# Patient Record
Sex: MALE | Race: WHITE | Employment: OTHER | URBAN - METROPOLITAN AREA
[De-identification: names, ages, dates, MRNs, and addresses within clinical notes are randomized per-mention and may not be internally consistent; named-entity substitution may affect disease eponyms.]

---

## 2023-04-03 LAB
ALANINE AMINOTRANSFERASE (SGPT) (U/L) IN SER/PLAS: 21 U/L (ref 10–52)
ALBUMIN (G/DL) IN SER/PLAS: 4.3 G/DL (ref 3.4–5)
ALKALINE PHOSPHATASE (U/L) IN SER/PLAS: 64 U/L (ref 33–136)
ANION GAP IN SER/PLAS: 14 MMOL/L (ref 10–20)
ASPARTATE AMINOTRANSFERASE (SGOT) (U/L) IN SER/PLAS: 23 U/L (ref 9–39)
BASOPHILS (10*3/UL) IN BLOOD BY AUTOMATED COUNT: 0.04 X10E9/L (ref 0–0.1)
BASOPHILS/100 LEUKOCYTES IN BLOOD BY AUTOMATED COUNT: 1 % (ref 0–2)
BILIRUBIN TOTAL (MG/DL) IN SER/PLAS: 0.9 MG/DL (ref 0–1.2)
CALCIUM (MG/DL) IN SER/PLAS: 9.4 MG/DL (ref 8.6–10.3)
CARBON DIOXIDE, TOTAL (MMOL/L) IN SER/PLAS: 26 MMOL/L (ref 21–32)
CHLORIDE (MMOL/L) IN SER/PLAS: 105 MMOL/L (ref 98–107)
CHOLESTEROL (MG/DL) IN SER/PLAS: 123 MG/DL (ref 0–199)
CHOLESTEROL IN HDL (MG/DL) IN SER/PLAS: 57.7 MG/DL
CHOLESTEROL/HDL RATIO: 2.1
CREATININE (MG/DL) IN SER/PLAS: 1.03 MG/DL (ref 0.5–1.3)
EOSINOPHILS (10*3/UL) IN BLOOD BY AUTOMATED COUNT: 0.13 X10E9/L (ref 0–0.7)
EOSINOPHILS/100 LEUKOCYTES IN BLOOD BY AUTOMATED COUNT: 3.3 % (ref 0–6)
ERYTHROCYTE DISTRIBUTION WIDTH (RATIO) BY AUTOMATED COUNT: 13.9 % (ref 11.5–14.5)
ERYTHROCYTE MEAN CORPUSCULAR HEMOGLOBIN CONCENTRATION (G/DL) BY AUTOMATED: 32.6 G/DL (ref 32–36)
ERYTHROCYTE MEAN CORPUSCULAR VOLUME (FL) BY AUTOMATED COUNT: 86 FL (ref 80–100)
ERYTHROCYTES (10*6/UL) IN BLOOD BY AUTOMATED COUNT: 5.29 X10E12/L (ref 4.5–5.9)
GFR MALE: 79 ML/MIN/1.73M2
GLUCOSE (MG/DL) IN SER/PLAS: 83 MG/DL (ref 74–99)
HEMATOCRIT (%) IN BLOOD BY AUTOMATED COUNT: 45.4 % (ref 41–52)
HEMOGLOBIN (G/DL) IN BLOOD: 14.8 G/DL (ref 13.5–17.5)
IMMATURE GRANULOCYTES/100 LEUKOCYTES IN BLOOD BY AUTOMATED COUNT: 0.3 % (ref 0–0.9)
LDL: 51 MG/DL (ref 0–99)
LEUKOCYTES (10*3/UL) IN BLOOD BY AUTOMATED COUNT: 3.9 X10E9/L (ref 4.4–11.3)
LYMPHOCYTES (10*3/UL) IN BLOOD BY AUTOMATED COUNT: 1.58 X10E9/L (ref 1.2–4.8)
LYMPHOCYTES/100 LEUKOCYTES IN BLOOD BY AUTOMATED COUNT: 40.5 % (ref 13–44)
MONOCYTES (10*3/UL) IN BLOOD BY AUTOMATED COUNT: 0.33 X10E9/L (ref 0.1–1)
MONOCYTES/100 LEUKOCYTES IN BLOOD BY AUTOMATED COUNT: 8.5 % (ref 2–10)
NEUTROPHILS (10*3/UL) IN BLOOD BY AUTOMATED COUNT: 1.81 X10E9/L (ref 1.2–7.7)
NEUTROPHILS/100 LEUKOCYTES IN BLOOD BY AUTOMATED COUNT: 46.4 % (ref 40–80)
PLATELETS (10*3/UL) IN BLOOD AUTOMATED COUNT: 174 X10E9/L (ref 150–450)
POTASSIUM (MMOL/L) IN SER/PLAS: 4.2 MMOL/L (ref 3.5–5.3)
PROTEIN TOTAL: 6.8 G/DL (ref 6.4–8.2)
SODIUM (MMOL/L) IN SER/PLAS: 141 MMOL/L (ref 136–145)
THYROTROPIN (MIU/L) IN SER/PLAS BY DETECTION LIMIT <= 0.05 MIU/L: 1.48 MIU/L (ref 0.44–3.98)
TRIGLYCERIDE (MG/DL) IN SER/PLAS: 74 MG/DL (ref 0–149)
UREA NITROGEN (MG/DL) IN SER/PLAS: 12 MG/DL (ref 6–23)
VLDL: 15 MG/DL (ref 0–40)

## 2023-07-13 LAB — PROSTATE SPECIFIC AG (NG/ML) IN SER/PLAS: 1.7 NG/ML (ref 0–4)

## 2023-07-19 ENCOUNTER — TRANSCRIBE ORDERS (OUTPATIENT)
Dept: GENERAL RADIOLOGY | Age: 68
End: 2023-07-19

## 2023-07-19 ENCOUNTER — HOSPITAL ENCOUNTER (OUTPATIENT)
Dept: GENERAL RADIOLOGY | Age: 68
Discharge: HOME | End: 2023-07-21
Attending: INTERNAL MEDICINE
Payer: COMMERCIAL

## 2023-07-19 DIAGNOSIS — R05.9 COUGH, UNSPECIFIED TYPE: ICD-10-CM

## 2023-07-19 DIAGNOSIS — R05.9 COUGH, UNSPECIFIED TYPE: Primary | ICD-10-CM

## 2023-07-19 PROCEDURE — 71046 X-RAY EXAM CHEST 2 VIEWS: CPT

## 2024-01-02 DIAGNOSIS — N32.81 OAB (OVERACTIVE BLADDER): ICD-10-CM

## 2024-01-02 RX ORDER — MIRABEGRON 50 MG/1
1 TABLET, EXTENDED RELEASE ORAL DAILY
COMMUNITY
Start: 2019-04-22 | End: 2024-01-02 | Stop reason: SDUPTHER

## 2024-01-03 RX ORDER — MIRABEGRON 50 MG/1
50 TABLET, EXTENDED RELEASE ORAL DAILY
Qty: 90 TABLET | Refills: 3 | Status: SHIPPED | OUTPATIENT
Start: 2024-01-03

## 2024-01-23 ENCOUNTER — OFFICE VISIT (OUTPATIENT)
Dept: UROLOGY | Facility: CLINIC | Age: 69
End: 2024-01-23
Payer: MEDICARE

## 2024-01-23 VITALS
SYSTOLIC BLOOD PRESSURE: 137 MMHG | HEART RATE: 71 BPM | RESPIRATION RATE: 16 BRPM | BODY MASS INDEX: 24.65 KG/M2 | DIASTOLIC BLOOD PRESSURE: 72 MMHG | HEIGHT: 70 IN | WEIGHT: 172.18 LBS

## 2024-01-23 DIAGNOSIS — R30.0 DYSURIA: ICD-10-CM

## 2024-01-23 DIAGNOSIS — N34.3 DYSURIA-FREQUENCY SYNDROME: ICD-10-CM

## 2024-01-23 DIAGNOSIS — R82.89 ABNORMAL URINE CYTOLOGY: Primary | ICD-10-CM

## 2024-01-23 DIAGNOSIS — N40.1 BENIGN PROSTATIC HYPERPLASIA WITH LOWER URINARY TRACT SYMPTOMS, SYMPTOM DETAILS UNSPECIFIED: ICD-10-CM

## 2024-01-23 DIAGNOSIS — R85.618: ICD-10-CM

## 2024-01-23 DIAGNOSIS — R35.1 NOCTURIA: ICD-10-CM

## 2024-01-23 PROCEDURE — 1036F TOBACCO NON-USER: CPT | Performed by: UROLOGY

## 2024-01-23 PROCEDURE — 1159F MED LIST DOCD IN RCRD: CPT | Performed by: UROLOGY

## 2024-01-23 PROCEDURE — 1126F AMNT PAIN NOTED NONE PRSNT: CPT | Performed by: UROLOGY

## 2024-01-23 PROCEDURE — 99213 OFFICE O/P EST LOW 20 MIN: CPT | Performed by: UROLOGY

## 2024-01-23 RX ORDER — DUTASTERIDE AND TAMSULOSIN HYDROCHLORIDE CAPSULES .5; .4 MG/1; MG/1
1 CAPSULE ORAL DAILY
COMMUNITY
Start: 2019-04-22

## 2024-01-23 ASSESSMENT — PAIN SCALES - GENERAL: PAINLEVEL: 0-NO PAIN

## 2024-01-23 NOTE — PROGRESS NOTES
Patient denies any pain today. Patient has not had any recent surgeries or hospital admits. Patient denies any concerns about falling or safety. Patient has no new urinary issues. Patient taking Rohini and Myrbetriq as directed.CV     Review of Systems   Genitourinary: Negative.    All other systems reviewed and are negative.

## 2024-01-23 NOTE — LETTER
January 23, 2024     Gio Thayer MD  [address]  [place] [address]    Patient: Markie Edwards   YOB: 1955   Date of Visit: 1/23/2024       Dear Dr. Gio Thayer MD:    Thank you for referring Markie Edwards to me for evaluation. Below are my notes for this consultation.  If you have questions, please do not hesitate to call me. I look forward to following your patient along with you.       Sincerely,     Elías Robbins MD      CC: No Recipients  ______________________________________________________________________________________      Provider Impressions     68 year-old white male retired at the age of 55. No family history of prostate cancer. 15-pack-year cigarette smoking.     02/14/13 CYSTOSCOPY revealed a severely ELEVATED BLADDER NECK with a flow rate of 6 cc per second and a PVR of 90. Patient was begun on Rohini and Toviaz at the 4 mg dose       ROHINI      08/06/13 . Patient complains of ERECTILE DYSFUNCTION and saw Dr. Borrego.     04/30/14, . Corrected PSA 0.8. Daytime URINARY FREQUENCY every 3 hours and nocturia x1.     04/27/15, flow rate of 11 with a PVR 15. COMPLEX HEPATIC CYSTS and SIMPLE RENAL CYSTS. Negative cytology. Corrected PSA of 0.6.     04/18/16, patient has been relegated from Rohini to tamsulosin and dutasteride due to insurance issues. His prescription for Toviaz has also doubled in price and he wishes to discontinue. Corrected PSA is 2.8. Urine cytology is negative. Renal ultrasound does show that the 1.4 cm COMPLEX CYST is increasing with debris and a recommended renal dedicated CAT scan. He will return in a month with that test.     06/03/16, dedicated renal CAT scan reveals a 13 mm left SIMPLE RENAL CYST, BOSNIAK TYPE I. The second cyst is most likely HEMORRHAGIC, BOSNIAK TYPE II LESION. There are no solid or enhancing masses. No tumors seen. Of note, a 12 mm NODULE IN THE LEFT LOWER LOBE OF THE LUNG was identified. He should return in 1 month with a  CAT scan of the chest.     07/08/16, CAT scan of the chest reveals a 1.6 cm left lower pole NODULE. This is the same as had been seen on the CAT scan of the abdomen. We will refer to our pulmonary specialist, Dr. Cardenas for further evaluation. Patient also wishes to return to Rohini rather than tamsulosin and dutasteride. He will return to my service in April.     10/26/16, OR, wedge resection of the lung, Dr. Haro. Pathology: Pulmonary harmartoma     04/21/17, patient continues to do well on his Rohini and Myrbetric 50 mg. Flow rate of 13 with a PVR of 0. Renal ultrasound shows stable complex left renal cysts. PSA 0.7. Urine cytology is negative. He will return in 1 year.     04/23/18, patient has no complaints and is very happy on his regimen of Rohini and Myrbetric at the 50 mg dose. Flow rate once again is excellent at 14 with a PVR of 0. Renal ultrasound once again shows stable complex cysts. PSA 0.3, corrected for Rohini 0.6. Cytology is negative. We will see him again in 1 year.     04/22/19, patient recently had meniscus repair of his left knee. He states that the combination of Rohini and Myrbetric at the 50 mg dose is working well. However he is interested in the resume procedure as he is concerned that the side effects of the medication may be decreasing his libido. I will recommend that he speak with Dr. Reveles and he will personally follow-up rather than official consultation. His flow rate is 8 cc/s with a PVR 54. Renal ultrasound shows stable cysts bilaterally. Cytology is negative in the corrected PSA is 0.8. He will return in 1 year.     07/15/20, patient arrives alone. He continues his combination of Rohini and Myrbetric at the 50 mg dose which are both working well. He has a PVR of 14 cc and no further urgency. He did look into the resume procedure and was deemed unnecessary and his level of problem. Renal ultrasound shows stable bilateral renal cysts and stable hepatic cysts. Cytology is negative  for malignant cells and his corrected PSA once again is 0.8. He will return in 1 year.     July 16, 2021, patient arrives alone. He continues to have excellent results with his combination Rohini and Myrbetriq at the 50 mg dose. Flow rate 13 cc/s, total volume 468 cc, PVR 57 cc. He no longer has urgency or nocturia. Renal ultrasound once again shows stable bilateral renal cysts. Urine cytology is negative for malignant cells. His corrected PSA is continues to be 0.8. He will return in 1 year. He will be traveling to [place] with his wife to hike in several [employer] [place].     July 20, 2022, patient arrives alone. He has no urologic complaints and states that the combination of Rohini and Myrbetriq at the 50 mg dose is working well. Today's flow rate 14 cc/s, total volume 497 cc,  cc. No longer experiencing urinary urgency or nocturia. Renal ultrasound identifies unchanged, bilateral, simple renal cysts. Urine cytology is again negative for malignant cells. Corrected PSA 0.62. He will return in 1 year. Planning a trip to [place] summer.     September 1, 2022, OR, Dr. Vaughn Ashford, trigger finger release     July 25, 2023, patient arrives alone. He has no new urologic complaints. Combination of Rohini and Myrbetriq 50 mg daily continues to be successful. Today's flow rate 18 cc/s, total volume 332 cc and  cc. No further urinary urgency or nocturia. Renal ultrasound Shows bilateral cysts which are unchanged. Corrected PSA is 3.40. Urine cytology was atypical, cannot rule out neoplasm. Patient wishes to repeat the urine cytology in 6 months before determining whether to pursue a cystoscopy.    January 23, 2024, patient arrives alone.  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  No urologic complaints.  Repeat urine cytology was negative for malignant cells.  He will return in July.     PLAN:     #1 the patient will continue Rohini one tablet by mouth daily. And Myrbetric 50 mg by  mouth daily.     #2 the patient return in July 2024, for insurance purposes, with urine cytology, PSA, renal and bladder ultrasound, and flow rate with postvoid residual.    Physical Exam  Vitals and nursing note reviewed.   Constitutional:       Appearance: Normal appearance.   HENT:      Head: Normocephalic and atraumatic.   Pulmonary:      Effort: Pulmonary effort is normal.   Abdominal:      Palpations: Abdomen is soft.      Tenderness: There is no abdominal tenderness.   Musculoskeletal:         General: Normal range of motion.      Cervical back: Normal range of motion and neck supple.   Neurological:      General: No focal deficit present.      Mental Status: He is alert and oriented to person, place, and time.   Psychiatric:         Mood and Affect: Mood normal.         Behavior: Behavior normal.         This note was created with voice-recognition software and was not corrected for typographical or grammatical errors.

## 2024-01-23 NOTE — PATIENT INSTRUCTIONS
Patient Discussion/Summary     It was good to see you once again. Your repeat urine cytology does not show any evidence of malignant cells.  Therefore we will see you again in July.  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  The combination of "[employer] and [employer] is working extremely well.      This note was created with voice-recognition software and was not corrected for typographical or grammatical errors.

## 2024-01-23 NOTE — PROGRESS NOTES
Provider Impressions     68 year-old white male retired at the age of 55. No family history of prostate cancer. 15-pack-year cigarette smoking.     02/14/13 CYSTOSCOPY revealed a severely ELEVATED BLADDER NECK with a flow rate of 6 cc per second and a PVR of 90. Patient was begun on Tiana and Toviaz at the 4 mg dose       TIANA      08/06/13 . Patient complains of ERECTILE DYSFUNCTION and saw Dr. Borrego.     04/30/14, . Corrected PSA 0.8. Daytime URINARY FREQUENCY every 3 hours and nocturia x1.     04/27/15, flow rate of 11 with a PVR 15. COMPLEX HEPATIC CYSTS and SIMPLE RENAL CYSTS. Negative cytology. Corrected PSA of 0.6.     04/18/16, patient has been relegated from Tiana to tamsulosin and dutasteride due to insurance issues. His prescription for Toviaz has also doubled in price and he wishes to discontinue. Corrected PSA is 2.8. Urine cytology is negative. Renal ultrasound does show that the 1.4 cm COMPLEX CYST is increasing with debris and a recommended renal dedicated CAT scan. He will return in a month with that test.     06/03/16, dedicated renal CAT scan reveals a 13 mm left SIMPLE RENAL CYST, BOSNIAK TYPE I. The second cyst is most likely HEMORRHAGIC, BOSNIAK TYPE II LESION. There are no solid or enhancing masses. No tumors seen. Of note, a 12 mm NODULE IN THE LEFT LOWER LOBE OF THE LUNG was identified. He should return in 1 month with a CAT scan of the chest.     07/08/16, CAT scan of the chest reveals a 1.6 cm left lower pole NODULE. This is the same as had been seen on the CAT scan of the abdomen. We will refer to our pulmonary specialist, Dr. Cardenas for further evaluation. Patient also wishes to return to Tiana rather than tamsulosin and dutasteride. He will return to my service in April.     10/26/16, OR, wedge resection of the lung, Dr. Haro. Pathology: Pulmonary harmartoma     04/21/17, patient continues to do well on his Tiana and Myrbetric 50 mg. Flow rate of 13 with a PVR of 0.  Renal ultrasound shows stable complex left renal cysts. PSA 0.7. Urine cytology is negative. He will return in 1 year.     04/23/18, patient has no complaints and is very happy on his regimen of Rohini and Myrbetric at the 50 mg dose. Flow rate once again is excellent at 14 with a PVR of 0. Renal ultrasound once again shows stable complex cysts. PSA 0.3, corrected for Rohini 0.6. Cytology is negative. We will see him again in 1 year.     04/22/19, patient recently had meniscus repair of his left knee. He states that the combination of Rohini and Myrbetric at the 50 mg dose is working well. However he is interested in the resume procedure as he is concerned that the side effects of the medication may be decreasing his libido. I will recommend that he speak with Dr. Reveles and he will personally follow-up rather than official consultation. His flow rate is 8 cc/s with a PVR 54. Renal ultrasound shows stable cysts bilaterally. Cytology is negative in the corrected PSA is 0.8. He will return in 1 year.     07/15/20, patient arrives alone. He continues his combination of Rohini and Myrbetric at the 50 mg dose which are both working well. He has a PVR of 14 cc and no further urgency. He did look into the resume procedure and was deemed unnecessary and his level of problem. Renal ultrasound shows stable bilateral renal cysts and stable hepatic cysts. Cytology is negative for malignant cells and his corrected PSA once again is 0.8. He will return in 1 year.     July 16, 2021, patient arrives alone. He continues to have excellent results with his combination Rohini and Myrbetriq at the 50 mg dose. Flow rate 13 cc/s, total volume 468 cc, PVR 57 cc. He no longer has urgency or nocturia. Renal ultrasound once again shows stable bilateral renal cysts. Urine cytology is negative for malignant cells. His corrected PSA is continues to be 0.8. He will return in 1 year. He will be traveling to [place] with his wife to hike [place].     July 20, 2022, patient arrives alone. He has no urologic complaints and states that the combination of Rohiin and Myrbetriq at the 50 mg dose is working well. Today's flow rate 14 cc/s, total volume 497 cc,  cc. No longer experiencing urinary urgency or nocturia. Renal ultrasound identifies unchanged, bilateral, simple renal cysts. Urine cytology is again negative for malignant cells. Corrected PSA 0.62. He will return in 1 year. Planning a trip to [place].     September 1, 2022, OR, Dr. Vaughn Ashford, trigger finger release     July 25, 2023, patient arrives alone. He has no new urologic complaints. Combination of Rohini and Myrbetriq 50 mg daily continues to be successful. Today's flow rate 18 cc/s, total volume 332 cc and  cc. No further urinary urgency or nocturia. Renal ultrasound Shows bilateral cysts which are unchanged. Corrected PSA is 3.40. Urine cytology was atypical, cannot rule out neoplasm. Patient wishes to repeat the urine cytology in 6 months before determining whether to pursue a cystoscopy.    January 23, 2024, patient arrives alone.  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  No urologic complaints.  Repeat urine cytology was negative for malignant cells.  He will return in July.     PLAN:     #1 the patient will continue Rohini one tablet by mouth daily. And Myrbetric 50 mg by mouth daily.     #2 the patient return in July 2024, for insurance purposes, with urine cytology, PSA, renal and bladder ultrasound, and flow rate with postvoid residual.    Physical Exam  Vitals and nursing note reviewed.   Constitutional:       Appearance: Normal appearance.   HENT:      Head: Normocephalic and atraumatic.   Pulmonary:      Effort: Pulmonary effort is normal.   Abdominal:      Palpations: Abdomen is soft.      Tenderness: There is no abdominal tenderness.   Musculoskeletal:         General: Normal range of motion.      Cervical back:  Normal range of motion and neck supple.   Neurological:      General: No focal deficit present.      Mental Status: He is alert and oriented to person, place, and time.   Psychiatric:         Mood and Affect: Mood normal.         Behavior: Behavior normal.         This note was created with voice-recognition software and was not corrected for typographical or grammatical errors.

## 2024-06-14 DIAGNOSIS — N40.1 BENIGN PROSTATIC HYPERPLASIA WITH LOWER URINARY TRACT SYMPTOMS, SYMPTOM DETAILS UNSPECIFIED: ICD-10-CM

## 2024-06-14 RX ORDER — DUTASTERIDE AND TAMSULOSIN HYDROCHLORIDE CAPSULES .5; .4 MG/1; MG/1
1 CAPSULE ORAL DAILY
Qty: 90 CAPSULE | Refills: 3 | Status: SHIPPED | OUTPATIENT
Start: 2024-06-14

## 2024-07-23 ENCOUNTER — LAB (OUTPATIENT)
Dept: LAB | Facility: LAB | Age: 69
End: 2024-07-23
Payer: MEDICARE

## 2024-07-23 ENCOUNTER — HOSPITAL ENCOUNTER (OUTPATIENT)
Dept: RADIOLOGY | Facility: HOSPITAL | Age: 69
Discharge: HOME | End: 2024-07-23
Payer: MEDICARE

## 2024-07-23 DIAGNOSIS — R35.1 NOCTURIA: ICD-10-CM

## 2024-07-23 DIAGNOSIS — R30.0 DYSURIA: ICD-10-CM

## 2024-07-23 DIAGNOSIS — N34.3 DYSURIA-FREQUENCY SYNDROME: ICD-10-CM

## 2024-07-23 LAB — PSA SERPL-MCNC: 0.42 NG/ML

## 2024-07-23 PROCEDURE — 84153 ASSAY OF PSA TOTAL: CPT

## 2024-07-23 PROCEDURE — 36415 COLL VENOUS BLD VENIPUNCTURE: CPT

## 2024-07-23 PROCEDURE — 88112 CYTOPATH CELL ENHANCE TECH: CPT

## 2024-07-23 PROCEDURE — 76770 US EXAM ABDO BACK WALL COMP: CPT

## 2024-07-24 LAB
LABORATORY COMMENT REPORT: NORMAL
LABORATORY COMMENT REPORT: NORMAL
PATH REPORT.FINAL DX SPEC: NORMAL
PATH REPORT.GROSS SPEC: NORMAL
PATH REPORT.RELEVANT HX SPEC: NORMAL
PATH REPORT.TOTAL CANCER: NORMAL

## 2024-07-31 ENCOUNTER — APPOINTMENT (OUTPATIENT)
Dept: UROLOGY | Facility: CLINIC | Age: 69
End: 2024-07-31
Payer: MEDICARE

## 2024-07-31 VITALS
HEIGHT: 70 IN | RESPIRATION RATE: 16 BRPM | SYSTOLIC BLOOD PRESSURE: 126 MMHG | WEIGHT: 171.3 LBS | DIASTOLIC BLOOD PRESSURE: 63 MMHG | BODY MASS INDEX: 24.52 KG/M2 | HEART RATE: 80 BPM

## 2024-07-31 DIAGNOSIS — N40.1 BENIGN PROSTATIC HYPERPLASIA WITH LOWER URINARY TRACT SYMPTOMS, SYMPTOM DETAILS UNSPECIFIED: Primary | ICD-10-CM

## 2024-07-31 DIAGNOSIS — N40.1 BENIGN PROSTATIC HYPERPLASIA WITH LOWER URINARY TRACT SYMPTOMS, SYMPTOM DETAILS UNSPECIFIED: ICD-10-CM

## 2024-07-31 DIAGNOSIS — N28.1 RENAL CYST: ICD-10-CM

## 2024-07-31 DIAGNOSIS — N32.81 OAB (OVERACTIVE BLADDER): ICD-10-CM

## 2024-07-31 DIAGNOSIS — R82.89 ABNORMAL URINE CYTOLOGY: ICD-10-CM

## 2024-07-31 PROCEDURE — 1160F RVW MEDS BY RX/DR IN RCRD: CPT | Performed by: UROLOGY

## 2024-07-31 PROCEDURE — 1126F AMNT PAIN NOTED NONE PRSNT: CPT | Performed by: UROLOGY

## 2024-07-31 PROCEDURE — 51798 US URINE CAPACITY MEASURE: CPT | Performed by: UROLOGY

## 2024-07-31 PROCEDURE — 99214 OFFICE O/P EST MOD 30 MIN: CPT | Performed by: UROLOGY

## 2024-07-31 PROCEDURE — 1036F TOBACCO NON-USER: CPT | Performed by: UROLOGY

## 2024-07-31 PROCEDURE — 1159F MED LIST DOCD IN RCRD: CPT | Performed by: UROLOGY

## 2024-07-31 PROCEDURE — 3008F BODY MASS INDEX DOCD: CPT | Performed by: UROLOGY

## 2024-07-31 RX ORDER — TADALAFIL 20 MG/1
20 TABLET ORAL DAILY PRN
Qty: 90 TABLET | Refills: 0 | Status: SHIPPED | OUTPATIENT
Start: 2024-07-31 | End: 2024-10-29

## 2024-07-31 RX ORDER — DUTASTERIDE AND TAMSULOSIN HYDROCHLORIDE CAPSULES .5; .4 MG/1; MG/1
1 CAPSULE ORAL DAILY
Qty: 90 CAPSULE | Refills: 3 | Status: SHIPPED | OUTPATIENT
Start: 2024-07-31

## 2024-07-31 RX ORDER — MIRABEGRON 50 MG/1
50 TABLET, EXTENDED RELEASE ORAL DAILY
Qty: 90 TABLET | Refills: 3 | Status: SHIPPED | OUTPATIENT
Start: 2024-07-31

## 2024-07-31 ASSESSMENT — PAIN SCALES - GENERAL: PAINLEVEL: 0-NO PAIN

## 2024-07-31 ASSESSMENT — ENCOUNTER SYMPTOMS
DIFFICULTY URINATING: 0
HEMATURIA: 0
DYSURIA: 0

## 2024-07-31 NOTE — PATIENT INSTRUCTIONS
Patient Discussion/Summary     It was good to see you once again. Your repeat urine cytology does not show any evidence of malignant cells.  Corrected PSA is 0.84.  Ultrasound of the kidneys shows stable simple cyst.  Therefore we will see you again in July.  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  The combination of [employer] and [employer] is working extremely well.      This note was created with voice-recognition software and was not corrected for typographical or grammatical errors.

## 2024-07-31 NOTE — LETTER
July 31, 2024     Gio Thayer MD  [address]  [place] [address]    Patient: Markie Edwards   YOB: 1955   Date of Visit: 7/31/2024       Dear Dr. Gio Thayer MD:    Thank you for referring Markie Edwards to me for evaluation. Below are my notes for this consultation.  If you have questions, please do not hesitate to call me. I look forward to following your patient along with you.       Sincerely,     Elías Robbins MD      CC: No Recipients  ______________________________________________________________________________________      Provider Impressions     69 year-old white male retired at the age of 55. No family history of prostate cancer. 15-pack-year cigarette smoking.     02/14/13 CYSTOSCOPY revealed a severely ELEVATED BLADDER NECK with a flow rate of 6 cc per second and a PVR of 90. Patient was begun on Rohini and Toviaz at the 4 mg dose       ROHINI      08/06/13 . Patient complains of ERECTILE DYSFUNCTION and saw Dr. Borrego.     04/30/14, . Corrected PSA 0.8. Daytime URINARY FREQUENCY every 3 hours and nocturia x1.     04/27/15, flow rate of 11 with a PVR 15. COMPLEX HEPATIC CYSTS and SIMPLE RENAL CYSTS. Negative cytology. Corrected PSA of 0.6.     04/18/16, patient has been relegated from Rohini to tamsulosin and dutasteride due to insurance issues. His prescription for Toviaz has also doubled in price and he wishes to discontinue. Corrected PSA is 2.8. Urine cytology is negative. Renal ultrasound does show that the 1.4 cm COMPLEX CYST is increasing with debris and a recommended renal dedicated CAT scan. He will return in a month with that test.     06/03/16, dedicated renal CAT scan reveals a 13 mm left SIMPLE RENAL CYST, BOSNIAK TYPE I. The second cyst is most likely HEMORRHAGIC, BOSNIAK TYPE II LESION. There are no solid or enhancing masses. No tumors seen. Of note, a 12 mm NODULE IN THE LEFT LOWER LOBE OF THE LUNG was identified. He should return in 1 month with a  CAT scan of the chest.     07/08/16, CAT scan of the chest reveals a 1.6 cm left lower pole NODULE. This is the same as had been seen on the CAT scan of the abdomen. We will refer to our pulmonary specialist, Dr. Cardenas for further evaluation. Patient also wishes to return to Rohini rather than tamsulosin and dutasteride. He will return to my service in April.     10/26/16, OR, wedge resection of the lung, Dr. Haro. Pathology: Pulmonary harmartoma     04/21/17, patient continues to do well on his Rohini and Myrbetric 50 mg. Flow rate of 13 with a PVR of 0. Renal ultrasound shows stable complex left renal cysts. PSA 0.7. Urine cytology is negative. He will return in 1 year.     04/23/18, patient has no complaints and is very happy on his regimen of Rohini and Myrbetric at the 50 mg dose. Flow rate once again is excellent at 14 with a PVR of 0. Renal ultrasound once again shows stable complex cysts. PSA 0.3, corrected for Rohini 0.6. Cytology is negative. We will see him again in 1 year.     04/22/19, patient recently had meniscus repair of his left knee. He states that the combination of Rohini and Myrbetric at the 50 mg dose is working well. However he is interested in the resume procedure as he is concerned that the side effects of the medication may be decreasing his libido. I will recommend that he speak with Dr. Reveles and he will personally follow-up rather than official consultation. His flow rate is 8 cc/s with a PVR 54. Renal ultrasound shows stable cysts bilaterally. Cytology is negative in the corrected PSA is 0.8. He will return in 1 year.     07/15/20, patient arrives alone. He continues his combination of Rohini and Myrbetric at the 50 mg dose which are both working well. He has a PVR of 14 cc and no further urgency. He did look into the resume procedure and was deemed unnecessary and his level of problem. Renal ultrasound shows stable bilateral renal cysts and stable hepatic cysts. Cytology is negative  for malignant cells and his corrected PSA once again is 0.8. He will return in 1 year.     July 16, 2021, patient arrives alone. He continues to have excellent results with his combination Rohini and Myrbetriq at the 50 mg dose. Flow rate 13 cc/s, total volume 468 cc, PVR 57 cc. He no longer has urgency or nocturia. Renal ultrasound once again shows stable bilateral renal cysts. Urine cytology is negative for malignant cells. His corrected PSA is continues to be 0.8. He will return in 1 year. He will be traveling to [place] with his wife to hike in [place] [employer] [place].     July 20, 2022, patient arrives alone. He has no urologic complaints and states that the combination of Rohini and Myrbetriq at the 50 mg dose is working well. Today's flow rate 14 cc/s, total volume 497 cc,  cc. No longer experiencing urinary urgency or nocturia. Renal ultrasound identifies unchanged, bilateral, simple renal cysts. Urine cytology is again negative for malignant cells. Corrected PSA 0.62. He will return in 1 year. Planning a trip to [place] summer.     September 1, 2022, OR, Dr. Vaughn Ashford, trigger finger release     July 25, 2023, patient arrives alone. He has no new urologic complaints. Combination of Rohini and Myrbetriq 50 mg daily continues to be successful. Today's flow rate 18 cc/s, total volume 332 cc and  cc. No further urinary urgency or nocturia. Renal ultrasound Shows bilateral cysts which are unchanged. Corrected PSA is 3.40. Urine cytology was atypical, cannot rule out neoplasm. Patient wishes to repeat the urine cytology in 6 months before determining whether to pursue a cystoscopy.     January 23, 2024, patient arrives alone.  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  No urologic complaints.  Repeat urine cytology was negative for malignant cells.  He will return in July.    July 31, 2024, patient arrives alone.  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  No urologic complaints.  Urine cytology is negative for malignant cells.  Corrected PSA is 0.84.  Renal ultrasound shows simple, bilateral renal cysts unchanged.  He continues on daily Rohini and Myrbetriq.  Also Cialis 20 mg as needed.  PVR of 88 cc.  He will return in 1 year.     PLAN:     #1 the patient will continue Rohini one tablet by mouth daily. And Myrbetric 50 mg by mouth daily.  Cialis 20 mg as needed     #2 the patient return in July 2025, for insurance purposes, with urine cytology, PSA, renal and bladder ultrasound, and postvoid residual.     Physical Exam  Vitals and nursing note reviewed.   Constitutional:       Appearance: Normal appearance.   HENT:      Head: Normocephalic and atraumatic.   Pulmonary:      Effort: Pulmonary effort is normal.   Abdominal:      Palpations: Abdomen is soft.      Tenderness: There is no abdominal tenderness.   Musculoskeletal:         General: Normal range of motion.      Cervical back: Normal range of motion and neck supple.   Neurological:      General: No focal deficit present.      Mental Status: He is alert and oriented to person, place, and time.   Psychiatric:         Mood and Affect: Mood normal.         Behavior: Behavior normal.        This note was created with voice-recognition software and was not corrected for typographical or grammatical errors.

## 2024-07-31 NOTE — PROGRESS NOTES
Patient denies any pain today. Patient has not had any recent surgeries or hospital admits. Patient denies any concern about falling or safety. Patient has no urinary issues. Patient taking Rohini and Myrbetriq as directed. CV    Review of Systems   Genitourinary:  Negative for decreased urine volume, difficulty urinating, dysuria, enuresis, hematuria and urgency.

## 2024-07-31 NOTE — PROGRESS NOTES
Provider Impressions     69 year-old white male retired at the age of 55. No family history of prostate cancer. 15-pack-year cigarette smoking.     02/14/13 CYSTOSCOPY revealed a severely ELEVATED BLADDER NECK with a flow rate of 6 cc per second and a PVR of 90. Patient was begun on Tiana and Toviaz at the 4 mg dose       TIANA      08/06/13 . Patient complains of ERECTILE DYSFUNCTION and saw Dr. Borrego.     04/30/14, . Corrected PSA 0.8. Daytime URINARY FREQUENCY every 3 hours and nocturia x1.     04/27/15, flow rate of 11 with a PVR 15. COMPLEX HEPATIC CYSTS and SIMPLE RENAL CYSTS. Negative cytology. Corrected PSA of 0.6.     04/18/16, patient has been relegated from Tiana to tamsulosin and dutasteride due to insurance issues. His prescription for Toviaz has also doubled in price and he wishes to discontinue. Corrected PSA is 2.8. Urine cytology is negative. Renal ultrasound does show that the 1.4 cm COMPLEX CYST is increasing with debris and a recommended renal dedicated CAT scan. He will return in a month with that test.     06/03/16, dedicated renal CAT scan reveals a 13 mm left SIMPLE RENAL CYST, BOSNIAK TYPE I. The second cyst is most likely HEMORRHAGIC, BOSNIAK TYPE II LESION. There are no solid or enhancing masses. No tumors seen. Of note, a 12 mm NODULE IN THE LEFT LOWER LOBE OF THE LUNG was identified. He should return in 1 month with a CAT scan of the chest.     07/08/16, CAT scan of the chest reveals a 1.6 cm left lower pole NODULE. This is the same as had been seen on the CAT scan of the abdomen. We will refer to our pulmonary specialist, Dr. Cardenas for further evaluation. Patient also wishes to return to Tiana rather than tamsulosin and dutasteride. He will return to my service in April.     10/26/16, OR, wedge resection of the lung, Dr. Haro. Pathology: Pulmonary harmartoma     04/21/17, patient continues to do well on his Tiana and Myrbetric 50 mg. Flow rate of 13 with a PVR of 0.  Renal ultrasound shows stable complex left renal cysts. PSA 0.7. Urine cytology is negative. He will return in 1 year.     04/23/18, patient has no complaints and is very happy on his regimen of Rohini and Myrbetric at the 50 mg dose. Flow rate once again is excellent at 14 with a PVR of 0. Renal ultrasound once again shows stable complex cysts. PSA 0.3, corrected for Rohini 0.6. Cytology is negative. We will see him again in 1 year.     04/22/19, patient recently had meniscus repair of his left knee. He states that the combination of Rohini and Myrbetric at the 50 mg dose is working well. However he is interested in the resume procedure as he is concerned that the side effects of the medication may be decreasing his libido. I will recommend that he speak with Dr. Reveles and he will personally follow-up rather than official consultation. His flow rate is 8 cc/s with a PVR 54. Renal ultrasound shows stable cysts bilaterally. Cytology is negative in the corrected PSA is 0.8. He will return in 1 year.     07/15/20, patient arrives alone. He continues his combination of Rohini and Myrbetric at the 50 mg dose which are both working well. He has a PVR of 14 cc and no further urgency. He did look into the resume procedure and was deemed unnecessary and his level of problem. Renal ultrasound shows stable bilateral renal cysts and stable hepatic cysts. Cytology is negative for malignant cells and his corrected PSA once again is 0.8. He will return in 1 year.     July 16, 2021, patient arrives alone. He continues to have excellent results with his combination Rohini and Myrbetriq at the 50 mg dose. Flow rate 13 cc/s, total volume 468 cc, PVR 57 cc. He no longer has urgency or nocturia. Renal ultrasound once again shows stable bilateral renal cysts. Urine cytology is negative for malignant cells. His corrected PSA is continues to be 0.8. He will return in 1 year. He will be traveling to [place] with his wife to hike in [place].     July 20, 2022, patient arrives alone. He has no urologic complaints and states that the combination of Rohini and Myrbetriq at the 50 mg dose is working well. Today's flow rate 14 cc/s, total volume 497 cc,  cc. No longer experiencing urinary urgency or nocturia. Renal ultrasound identifies unchanged, bilateral, simple renal cysts. Urine cytology is again negative for malignant cells. Corrected PSA 0.62. He will return in 1 year. Planning a trip to [place] in [place] summer.     September 1, 2022, OR, Dr. Vaughn Ashford, trigger finger release     July 25, 2023, patient arrives alone. He has no new urologic complaints. Combination of Rohini and Myrbetriq 50 mg daily continues to be successful. Today's flow rate 18 cc/s, total volume 332 cc and  cc. No further urinary urgency or nocturia. Renal ultrasound Shows bilateral cysts which are unchanged. Corrected PSA is 3.40. Urine cytology was atypical, cannot rule out neoplasm. Patient wishes to repeat the urine cytology in 6 months before determining whether to pursue a cystoscopy.     January 23, 2024, patient arrives alone.  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  No urologic complaints.  Repeat urine cytology was negative for malignant cells.  He will return in July.    July 31, 2024, patient arrives alone.  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  No urologic complaints.  Urine cytology is negative for malignant cells.  Corrected PSA is 0.84.  Renal ultrasound shows simple, bilateral renal cysts unchanged.  He continues on daily Rohini and Myrbetriq.  Also Cialis 20 mg as needed.  PVR of 88 cc.  He will return in 1 year.     PLAN:     #1 the patient will continue Rohini one tablet by mouth daily. And Myrbetric 50 mg by mouth daily.  Cialis 20 mg as needed     #2 the patient return in July 2025, for insurance purposes, with urine cytology, PSA, renal and bladder ultrasound, and postvoid  residual.     Physical Exam  Vitals and nursing note reviewed.   Constitutional:       Appearance: Normal appearance.   HENT:      Head: Normocephalic and atraumatic.   Pulmonary:      Effort: Pulmonary effort is normal.   Abdominal:      Palpations: Abdomen is soft.      Tenderness: There is no abdominal tenderness.   Musculoskeletal:         General: Normal range of motion.      Cervical back: Normal range of motion and neck supple.   Neurological:      General: No focal deficit present.      Mental Status: He is alert and oriented to person, place, and time.   Psychiatric:         Mood and Affect: Mood normal.         Behavior: Behavior normal.        This note was created with voice-recognition software and was not corrected for typographical or grammatical errors.

## 2024-11-27 ENCOUNTER — OFFICE VISIT (OUTPATIENT)
Dept: ORTHOPEDIC SURGERY | Facility: CLINIC | Age: 69
End: 2024-11-27
Payer: MEDICARE

## 2024-11-27 ENCOUNTER — HOSPITAL ENCOUNTER (OUTPATIENT)
Dept: RADIOLOGY | Facility: HOSPITAL | Age: 69
Discharge: HOME | End: 2024-11-27
Payer: MEDICARE

## 2024-11-27 DIAGNOSIS — M79.641 PAIN OF RIGHT HAND: ICD-10-CM

## 2024-11-27 DIAGNOSIS — M65.351 TRIGGER FINGER, RIGHT LITTLE FINGER: Primary | ICD-10-CM

## 2024-11-27 PROCEDURE — 73130 X-RAY EXAM OF HAND: CPT | Mod: RT

## 2024-11-27 PROCEDURE — 1036F TOBACCO NON-USER: CPT | Performed by: ORTHOPAEDIC SURGERY

## 2024-11-27 PROCEDURE — 1159F MED LIST DOCD IN RCRD: CPT | Performed by: ORTHOPAEDIC SURGERY

## 2024-11-27 PROCEDURE — 73130 X-RAY EXAM OF HAND: CPT | Mod: RIGHT SIDE | Performed by: RADIOLOGY

## 2024-11-27 PROCEDURE — 99214 OFFICE O/P EST MOD 30 MIN: CPT | Performed by: ORTHOPAEDIC SURGERY

## 2024-11-27 NOTE — PROGRESS NOTES
"    History: Bryan \"Markie\" is here for his right hand. He has a trigger finger on his right fourth since about June. His finger will lock up when doing certain tasks. He does have some pain in his fifth finger as well. He denies any numbness and tingling. 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     Past medical history: Multiple  Medications: Multiple  Allergies: No known drug allergies    Please refer to the intake H&P regarding the patient's review of systems, family history and social history as was done today    HEENT: Normal  Lungs: Clear to auscultation  Heart: RRR  Abdomen: Soft, nontender  Skin: clear  Extremity: He has tenderness around the A1 pulley of the right fourth finger.  There is no active triggering however.  FDP, FDS and extensor function is intact.  No redness or skin irritation.  No numbness or tingling.  Contralateral exam is normal for strength, motion, stability and neurovascular assessment.    Radiographs: X-rays of the right hand are essentially negative.    Assessment: Right fourth trigger finger     Plan: We discussed his options for treatment including injections and surgery. He would like to proceed with trigger finger release. Risks, benefits, and alternatives to surgery were discussed. We also discussed healing time after surgery as well as the need to avoid heavy  activities for the first few weeks.  Risks and benefits are again noted including infection, stiffness, nerve damage, continued pain and triggering as well as need for subsequent operation.  We will get him set up for surgery at his discretion.  He can follow-up 1 week postoperatively for wound check.  He will require a postoperative pain medicine prescription.  All questions were answered today with the patient.      Renata Attestation  By signing my name below, ICecelia Scribe   attest that this documentation has been prepared under the direction and in the presence of Vaughn Ashford MD.    "

## 2024-12-20 ENCOUNTER — LAB (OUTPATIENT)
Dept: LAB | Facility: HOSPITAL | Age: 69
End: 2024-12-20
Payer: MEDICARE

## 2024-12-20 ENCOUNTER — HOSPITAL ENCOUNTER (OUTPATIENT)
Dept: CARDIOLOGY | Facility: HOSPITAL | Age: 69
Discharge: HOME | End: 2024-12-20
Payer: MEDICARE

## 2024-12-20 DIAGNOSIS — I12.9 HYPERTENSIVE CHRONIC KIDNEY DISEASE WITH STAGE 1 THROUGH STAGE 4 CHRONIC KIDNEY DISEASE, OR UNSPECIFIED CHRONIC KIDNEY DISEASE: Primary | ICD-10-CM

## 2024-12-20 DIAGNOSIS — M65.341 TRIGGER FINGER, RIGHT RING FINGER: ICD-10-CM

## 2024-12-20 DIAGNOSIS — N18.2 CHRONIC KIDNEY DISEASE, STAGE 2 (MILD): ICD-10-CM

## 2024-12-20 DIAGNOSIS — E78.49 OTHER HYPERLIPIDEMIA: ICD-10-CM

## 2024-12-20 DIAGNOSIS — E03.8 OTHER SPECIFIED HYPOTHYROIDISM: ICD-10-CM

## 2024-12-20 LAB
ALBUMIN SERPL BCP-MCNC: 4.6 G/DL (ref 3.4–5)
ALP SERPL-CCNC: 65 U/L (ref 33–136)
ALT SERPL W P-5'-P-CCNC: 21 U/L (ref 10–52)
ANION GAP SERPL CALC-SCNC: 10 MMOL/L (ref 10–20)
APPEARANCE UR: CLEAR
APTT PPP: 31 SECONDS (ref 27–38)
AST SERPL W P-5'-P-CCNC: 30 U/L (ref 9–39)
ATRIAL RATE: 75 BPM
BASOPHILS # BLD AUTO: 0.05 X10*3/UL (ref 0–0.1)
BASOPHILS NFR BLD AUTO: 1.1 %
BILIRUB SERPL-MCNC: 0.7 MG/DL (ref 0–1.2)
BILIRUB UR STRIP.AUTO-MCNC: NEGATIVE MG/DL
BUN SERPL-MCNC: 14 MG/DL (ref 6–23)
CALCIUM SERPL-MCNC: 9.7 MG/DL (ref 8.6–10.3)
CHLORIDE SERPL-SCNC: 106 MMOL/L (ref 98–107)
CHOLEST SERPL-MCNC: 149 MG/DL (ref 0–199)
CHOLESTEROL/HDL RATIO: 2.8
CO2 SERPL-SCNC: 27 MMOL/L (ref 21–32)
COLOR UR: NORMAL
CREAT SERPL-MCNC: 0.96 MG/DL (ref 0.5–1.3)
EGFRCR SERPLBLD CKD-EPI 2021: 85 ML/MIN/1.73M*2
EOSINOPHIL # BLD AUTO: 0.29 X10*3/UL (ref 0–0.7)
EOSINOPHIL NFR BLD AUTO: 6.6 %
ERYTHROCYTE [DISTWIDTH] IN BLOOD BY AUTOMATED COUNT: 13.2 % (ref 11.5–14.5)
GLUCOSE SERPL-MCNC: 96 MG/DL (ref 74–99)
GLUCOSE UR STRIP.AUTO-MCNC: NORMAL MG/DL
HCT VFR BLD AUTO: 41.8 % (ref 41–52)
HDLC SERPL-MCNC: 52.8 MG/DL
HGB BLD-MCNC: 14.1 G/DL (ref 13.5–17.5)
IMM GRANULOCYTES # BLD AUTO: 0.02 X10*3/UL (ref 0–0.7)
IMM GRANULOCYTES NFR BLD AUTO: 0.5 % (ref 0–0.9)
INR PPP: 1 (ref 0.9–1.1)
KETONES UR STRIP.AUTO-MCNC: NEGATIVE MG/DL
LDLC SERPL CALC-MCNC: 77 MG/DL
LEUKOCYTE ESTERASE UR QL STRIP.AUTO: NEGATIVE
LYMPHOCYTES # BLD AUTO: 1.84 X10*3/UL (ref 1.2–4.8)
LYMPHOCYTES NFR BLD AUTO: 42.1 %
MCH RBC QN AUTO: 28 PG (ref 26–34)
MCHC RBC AUTO-ENTMCNC: 33.7 G/DL (ref 32–36)
MCV RBC AUTO: 83 FL (ref 80–100)
MONOCYTES # BLD AUTO: 0.31 X10*3/UL (ref 0.1–1)
MONOCYTES NFR BLD AUTO: 7.1 %
NEUTROPHILS # BLD AUTO: 1.86 X10*3/UL (ref 1.2–7.7)
NEUTROPHILS NFR BLD AUTO: 42.6 %
NITRITE UR QL STRIP.AUTO: NEGATIVE
NON HDL CHOLESTEROL: 96 MG/DL (ref 0–149)
NRBC BLD-RTO: 0 /100 WBCS (ref 0–0)
P AXIS: 48 DEGREES
P OFFSET: 194 MS
P ONSET: 136 MS
PH UR STRIP.AUTO: 6.5 [PH]
PLATELET # BLD AUTO: 169 X10*3/UL (ref 150–450)
POTASSIUM SERPL-SCNC: 4.4 MMOL/L (ref 3.5–5.3)
PR INTERVAL: 180 MS
PROT SERPL-MCNC: 7.3 G/DL (ref 6.4–8.2)
PROT UR STRIP.AUTO-MCNC: NEGATIVE MG/DL
PROTHROMBIN TIME: 11.2 SECONDS (ref 9.8–12.8)
Q ONSET: 226 MS
QRS COUNT: 12 BEATS
QRS DURATION: 90 MS
QT INTERVAL: 398 MS
QTC CALCULATION(BAZETT): 444 MS
QTC FREDERICIA: 428 MS
R AXIS: 90 DEGREES
RBC # BLD AUTO: 5.03 X10*6/UL (ref 4.5–5.9)
RBC # UR STRIP.AUTO: NEGATIVE /UL
SODIUM SERPL-SCNC: 139 MMOL/L (ref 136–145)
SP GR UR STRIP.AUTO: 1.02
T AXIS: 30 DEGREES
T OFFSET: 425 MS
TRIGL SERPL-MCNC: 96 MG/DL (ref 0–149)
TSH SERPL-ACNC: 1.47 MIU/L (ref 0.44–3.98)
UROBILINOGEN UR STRIP.AUTO-MCNC: NORMAL MG/DL
VENTRICULAR RATE: 75 BPM
VLDL: 19 MG/DL (ref 0–40)
WBC # BLD AUTO: 4.4 X10*3/UL (ref 4.4–11.3)

## 2024-12-20 PROCEDURE — 80053 COMPREHEN METABOLIC PANEL: CPT

## 2024-12-20 PROCEDURE — 81003 URINALYSIS AUTO W/O SCOPE: CPT

## 2024-12-20 PROCEDURE — 36415 COLL VENOUS BLD VENIPUNCTURE: CPT

## 2024-12-20 PROCEDURE — 84443 ASSAY THYROID STIM HORMONE: CPT

## 2024-12-20 PROCEDURE — 80061 LIPID PANEL: CPT

## 2024-12-20 PROCEDURE — 93005 ELECTROCARDIOGRAM TRACING: CPT

## 2024-12-20 PROCEDURE — 85025 COMPLETE CBC W/AUTO DIFF WBC: CPT

## 2024-12-20 PROCEDURE — 85610 PROTHROMBIN TIME: CPT

## 2025-01-03 DIAGNOSIS — M65.351 TRIGGER FINGER, RIGHT LITTLE FINGER: ICD-10-CM

## 2025-01-03 RX ORDER — OXYCODONE AND ACETAMINOPHEN 5; 325 MG/1; MG/1
1 TABLET ORAL EVERY 8 HOURS PRN
Qty: 8 TABLET | Refills: 0 | Status: SHIPPED | OUTPATIENT
Start: 2025-01-03 | End: 2025-01-06

## 2025-01-09 PROCEDURE — 26055 INCISE FINGER TENDON SHEATH: CPT | Performed by: ORTHOPAEDIC SURGERY

## 2025-01-13 NOTE — PROGRESS NOTES
History of Present Illness: Bryan Edwards is here today for a post op visit.  He is one week out from a 4th digit trigger finger release.  He is having normal post operative soreness, but overall doing well.     Physical Exam: The wound is healing nicely. No redness or drainage.  Swelling and ecchymosis are normal for this stage of healing. FDP and FDS functions are intact. There is no active triggering. No numbness or tingling.     Radiographs: None today.    Assessment: Stable right 4th digit trigger finger release, one week out.     Plan:   Sutures were removed.  Benzoin and steri strips were applied.  He can continue to use the hand as tolerated, but understands the need to protect the incision as it continues to heal.   We discussed it will be another 2-3 weeks for full healing.  We would be happy to see him back if he should have any issues. Otherwise, if doing well, he can follow up on an as-needed basis.  All questions were answered with the patient.     Scribe Attestation  By signing my name below, Cecelia HEATH Scribe   attest that this documentation has been prepared under the direction and in the presence of Vaughn Ashford MD.

## 2025-01-15 ENCOUNTER — OFFICE VISIT (OUTPATIENT)
Dept: ORTHOPEDIC SURGERY | Facility: CLINIC | Age: 70
End: 2025-01-15
Payer: MEDICARE

## 2025-01-15 DIAGNOSIS — Z98.890 S/P TRIGGER FINGER RELEASE: Primary | ICD-10-CM

## 2025-01-15 PROCEDURE — 99211 OFF/OP EST MAY X REQ PHY/QHP: CPT | Performed by: ORTHOPAEDIC SURGERY

## 2025-02-03 ENCOUNTER — CLINICAL SUPPORT (OUTPATIENT)
Dept: AUDIOLOGY | Facility: CLINIC | Age: 70
End: 2025-02-03
Payer: MEDICARE

## 2025-02-03 DIAGNOSIS — H90.42 SENSORINEURAL HEARING LOSS (SNHL) OF LEFT EAR WITH UNRESTRICTED HEARING OF RIGHT EAR: Primary | ICD-10-CM

## 2025-02-03 DIAGNOSIS — H93.12 TINNITUS OF LEFT EAR: ICD-10-CM

## 2025-02-03 PROCEDURE — 92550 TYMPANOMETRY & REFLEX THRESH: CPT | Mod: 52

## 2025-02-03 PROCEDURE — 92557 COMPREHENSIVE HEARING TEST: CPT

## 2025-02-03 ASSESSMENT — PAIN SCALES - GENERAL: PAINLEVEL_OUTOF10: 0 - NO PAIN

## 2025-02-03 ASSESSMENT — PAIN - FUNCTIONAL ASSESSMENT: PAIN_FUNCTIONAL_ASSESSMENT: 0-10

## 2025-02-03 NOTE — PROGRESS NOTES
AUDIOLOGIC EVALUATION  Name: Bryan Edwards  YOB: 1955  MRN: [id number]  Age: 69 y.o.    Date of Evaluation:  2/3/2025    History:  Bryan Edwards, 69 y.o., was seen today for a hearing evaluation prior to their appointment with Dr. Tucker. The patient reported high-pitched, ringing tinnitus in the left ear only for at least the past 6 months. He noted that it is constant and is more noticeable in quiet. 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 He denied hearing loss, otalgia, dizziness, and previous otologic surgery.    Evaluation:    Otoscopy  Clear canals bilaterally    Tympanometry  Right ear: Type A, normal ear canal volume and compliance.  Left ear: Type A, normal ear canal volume and compliance.     Acoustic Reflexes  Right ear: Ipsilateral acoustic reflexes present at 500 - 4000 Hz  Left ear: Ipsilateral acoustic reflexes present at 500 - 4000 Hz    Audiometric Evaluation  Right ear: normal hearing sensitivity. Word recognition ability estimated to be excellent(100%) at 55 dB HL based on an NU-6 recorded ordered by difficulty 10-word list.  Left ear: normal hearing sensitivity through 2000 Hz sloping to a moderate sensorineural hearing loss. Word recognition ability estimated to be excellent(92%) at 55 dB HL based on an NU-6 recorded 25-word list.    NOTE: significant asymmetry of 15 - 30 dB HL noted from 3000 - 8000 Hz (left poorer than right). Word recognition scores are asymmetric (left poorer than right).    The test results were discussed with the patient.     Impressions  Today's evaluation revealed normal hearing in the right ear and normal hearing through 2000 Hz sloping to a moderate sensorineural hearing loss. Word recognition abilities were measured to be excellent bilaterally. Tympanograms were type A (normal) in both ears.    The patient was encouraged to continue medical follow-up with  Dr. Tucker for medical investigation regarding his hearing asymmetry.    The link between hearing loss and tinnitus was discussed. The patient was encouraged to consider the use of noise machines to make their tinnitus less bothersome.      Recommendations  - Continue medical follow-up with established providers   - Continue medical follow-up with Dr. Tucker  - Consider the use of noise maskers in your environment to help your tinnitus become less noticeable    Time: 1181-9192    DOMINIC Fang, CCC-A  Licensed Audiologist

## 2025-02-03 NOTE — LETTER
February 3, 2025     Gio Thayer MD  [address]  [place] [address]    Patient: Markie Edwards   YOB: 1955   Date of Visit: 2/3/2025       Dear Dr. Gio Thayer MD:    Thank you for referring Markie Edwards to me for evaluation. Below are my notes for this consultation.  If you have questions, please do not hesitate to call me. I look forward to following your patient along with you.       Sincerely,     Justa Solorio, DOMINIC, CCC-A      CC: No Recipients  ______________________________________________________________________________________    AUDIOLOGIC EVALUATION  Name: Bryan Edwards  YOB: 1955  MRN: [id number]  Age: 69 y.o.    Date of Evaluation:  2/3/2025    History:  Bryan Edwards, 69 y.o., was seen today for a hearing evaluation prior to their appointment with Dr. Tucker. The patient reported high-pitched, ringing tinnitus in the left ear only for at least the past 6 months. He noted that it is constant and is more noticeable in quiet. 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 He denied hearing loss, otalgia, dizziness, and previous otologic surgery.    Evaluation:    Otoscopy  Clear canals bilaterally    Tympanometry  Right ear: Type A, normal ear canal volume and compliance.  Left ear: Type A, normal ear canal volume and compliance.     Acoustic Reflexes  Right ear: Ipsilateral acoustic reflexes present at 500 - 4000 Hz  Left ear: Ipsilateral acoustic reflexes present at 500 - 4000 Hz    Audiometric Evaluation  Right ear: normal hearing sensitivity. Word recognition ability estimated to be excellent(100%) at 55 dB HL based on an NU-6 recorded ordered by difficulty 10-word list.  Left ear: normal hearing sensitivity through 2000 Hz sloping to a moderate sensorineural hearing loss. Word recognition ability estimated to be excellent(92%) at 55 dB HL based on an NU-6 recorded 25-word  list.    NOTE: significant asymmetry of 15 - 30 dB HL noted from 3000 - 8000 Hz (left poorer than right). Word recognition scores are asymmetric (left poorer than right).    The test results were discussed with the patient.     Impressions  Today's evaluation revealed normal hearing in the right ear and normal hearing through 2000 Hz sloping to a moderate sensorineural hearing loss. Word recognition abilities were measured to be excellent bilaterally. Tympanograms were type A (normal) in both ears.    The patient was encouraged to continue medical follow-up with Dr. Tucker for medical investigation regarding his hearing asymmetry.    The link between hearing loss and tinnitus was discussed. The patient was encouraged to consider the use of noise machines to make their tinnitus less bothersome.      Recommendations  - Continue medical follow-up with established providers   - Continue medical follow-up with Dr. Tucker  - Consider the use of noise maskers in your environment to help your tinnitus become less noticeable    Time: 3779-7007    DOMINIC Fang, CCC-A  Licensed Audiologist

## 2025-03-17 ENCOUNTER — APPOINTMENT (OUTPATIENT)
Facility: CLINIC | Age: 70
End: 2025-03-17
Payer: MEDICARE

## 2025-03-17 VITALS
SYSTOLIC BLOOD PRESSURE: 131 MMHG | WEIGHT: 170 LBS | TEMPERATURE: 97.4 F | HEIGHT: 70 IN | DIASTOLIC BLOOD PRESSURE: 76 MMHG | BODY MASS INDEX: 24.34 KG/M2

## 2025-03-17 DIAGNOSIS — H90.3 ASYMMETRICAL SENSORINEURAL HEARING LOSS: Primary | ICD-10-CM

## 2025-03-17 DIAGNOSIS — H93.13 TINNITUS OF BOTH EARS: ICD-10-CM

## 2025-03-17 PROCEDURE — 1159F MED LIST DOCD IN RCRD: CPT | Performed by: OTOLARYNGOLOGY

## 2025-03-17 PROCEDURE — 1160F RVW MEDS BY RX/DR IN RCRD: CPT | Performed by: OTOLARYNGOLOGY

## 2025-03-17 PROCEDURE — 1036F TOBACCO NON-USER: CPT | Performed by: OTOLARYNGOLOGY

## 2025-03-17 PROCEDURE — 3008F BODY MASS INDEX DOCD: CPT | Performed by: OTOLARYNGOLOGY

## 2025-03-17 PROCEDURE — 99204 OFFICE O/P NEW MOD 45 MIN: CPT | Performed by: OTOLARYNGOLOGY

## 2025-03-17 RX ORDER — ACETAMINOPHEN 500 MG
TABLET ORAL
COMMUNITY

## 2025-03-17 RX ORDER — ASPIRIN 81 MG/1
81 TABLET ORAL
COMMUNITY

## 2025-03-17 RX ORDER — MULTIVITAMIN
1 TABLET ORAL
COMMUNITY

## 2025-03-17 RX ORDER — GLUC/MSM/COLGN2/HYAL/ANTIARTH3 375-375-20
TABLET ORAL
COMMUNITY

## 2025-03-17 RX ORDER — PRAVASTATIN SODIUM 40 MG/1
40 TABLET ORAL
COMMUNITY

## 2025-03-17 RX ORDER — OMEGA-3-ACID ETHYL ESTERS 1 G/1
CAPSULE, LIQUID FILLED ORAL 2 TIMES DAILY
COMMUNITY

## 2025-03-17 NOTE — PROGRESS NOTES
"Impression:  1. Asymmetrical sensorineural hearing loss        2. Tinnitus of both ears             RECOMMENDATIONS/PLAN :  I reassured the patient that medically his ears look excellent there is no evidence of any wax or middle ear effusion.  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  We will repeat an audiogram over the next 6 months to make sure there is no progression of hearing loss in that left ear.  I did mention we could get an MRI of his IACs with attention to the left ear however he would like to hold off for now.  I will see him back in the office in 6 months with a repeat audiogram.      **This electronic medical record note was created with the use of voice recognition software.  Despite proofreading, typographical or grammatical errors may be present that could affect meaning of content **    Subjective   Patient ID:     Bryan Edwards \"Manolo" is a 69 y.o. male who presents to the office today with a gradual onset of ringing that seems to be louder in his left ear.  He denies any spinning/vertigo or any neurologic complaints.  He denies any head pressure or fullness or pressure in the ears.  No upper respiratory complaints.  He denies any fluctuation in hearing.  He has had ringing in the ear for several months however it has gotten louder over the last 6 months.  No other ENT complaints today.    ROS:  A detailed 12 system review of systems is noted on the intake form has been reviewed with the patient with details noted in the HPI and scanned into the patient's medical record.    Objective     Past Medical History:   Diagnosis Date    Personal history of other endocrine, nutritional and metabolic disease     History of hypercholesterolemia    Pure hyperglyceridemia     Hypertriglyceridemia        Past Surgical History:   Procedure Laterality Date    OTHER SURGICAL HISTORY  04/27/2015    Prior Surgical Procedure Not Done    OTHER SURGICAL " "HISTORY  11/07/2016    Wedge Resection Of Lung        No Known Allergies       Current Outpatient Medications:     aspirin 81 mg EC tablet, Take 1 tablet (81 mg) by mouth once daily., Disp: , Rfl:     cholecalciferol (Vitamin D-3) 50 mcg (2,000 unit) capsule, Take by mouth., Disp: , Rfl:     dutasteride-tamsulosin 0.5-0.4 mg capsule, ER multiphase 24 hr, Take 1 capsule by mouth once daily., Disp: 90 capsule, Rfl: 3    ferrous gluconate 236 mg (27 mg iron) tablet, Take by mouth., Disp: , Rfl:     mirabegron (Myrbetriq) 50 mg tablet extended release 24 hr 24 hr tablet, Take 1 tablet (50 mg) by mouth once daily., Disp: 90 tablet, Rfl: 3    multivitamin tablet, Take 1 tablet by mouth once daily., Disp: , Rfl:     omega-3 acid ethyl esters (Lovaza) 1 gram capsule, Take by mouth twice a day., Disp: , Rfl:     pravastatin (Pravachol) 40 mg tablet, Take 1 tablet (40 mg) by mouth once daily., Disp: , Rfl:     tadalafil (Cialis) 20 mg tablet, Take 1 tablet (20 mg) by mouth once daily as needed for erectile dysfunction., Disp: 90 tablet, Rfl: 0     Tobacco Use: Medium Risk (3/17/2025)    Patient History     Smoking Tobacco Use: Former     Smokeless Tobacco Use: Never     Passive Exposure: Not on file        Alcohol Use: Not on file        Social History     Substance and Sexual Activity   Drug Use Not on file        Physical Exam:  Visit Vitals  /76   Temp 36.3 °C (97.4 °F) (Temporal)   Ht 1.778 m (5' 10\")   Wt 77.1 kg (170 lb)   BMI 24.39 kg/m²   Smoking Status Former   BSA 1.95 m²      General: Patient is alert, oriented, cooperative in no apparent distress.  Head: Normocephalic, atraumatic.  Eyes: PERRL, EOMI, Conjunctiva is clear. No nystagmus.  Ears: Right Ear-- Pinna is normal.  External auditory canal is patent. Tympanic membrane is [intact, translucent and has good mobility with my pneumatic otoscope. No effusion].  Mastoid is nontender.  Left ear-- Pinna is normal.  External auditory canal is patent. Tympanic " membrane is [intact, translucent and has good mobility with my pneumatic otoscope.  No effusion].  Mastoid is nontender.  Nose: Septum is relatively straight.  No septal perforation or lesions. No septal hematoma/ seroma.  No signs of bleeding.  Inferior turbinates are normal.   No evidence of intranasal polyps.  No infectious drainage.  Throat:  Floor of mouth is clear, no masses.  Tongue appears normal, no lesions or masses. Gums, gingiva, buccal mucosa appear pink and moist, no lesions. Teeth are in good repair.  No obvious dental infections.  Peritonsillar regions appear symmetric without swelling.  Hard and soft palate appear normal, no obvious cleft. Uvula is midline.  Oropharynx: No lesions. Retropharyngeal wall is flat.  No active postnasal drip.  Neck: Supple,  no lymphadenopathy.  No masses.  Salivary Glands: Symmetric bilaterally.  No palpable masses.  No evidence of acute infection or salivary stones  Neurologic: Cranial Nerves 2-12 are grossly intact without focal deficits. Cerebellar function testing is normal.     Results:   I reviewed his recent audiogram and he has essentially normal hearing in the right ear with a mild high-frequency sensorineural loss in the left ear.  Tympanic membrane's have normal mobility on tympanometry.  Word recognition scores 100% in the right ear and 92% in the left ear.  Speech reception threshold is 15 dB in the right ear and 5 dB in the left ear.    Procedure:   []    Tanner Tucker, DO

## 2025-07-15 ENCOUNTER — APPOINTMENT (OUTPATIENT)
Dept: RHEUMATOLOGY | Facility: CLINIC | Age: 70
End: 2025-07-15
Payer: MEDICARE

## 2025-07-15 VITALS
HEIGHT: 70 IN | WEIGHT: 178 LBS | DIASTOLIC BLOOD PRESSURE: 70 MMHG | BODY MASS INDEX: 25.48 KG/M2 | SYSTOLIC BLOOD PRESSURE: 147 MMHG | HEART RATE: 71 BPM

## 2025-07-15 DIAGNOSIS — M18.11 PRIMARY OSTEOARTHRITIS OF FIRST CARPOMETACARPAL JOINT OF RIGHT HAND: ICD-10-CM

## 2025-07-15 DIAGNOSIS — M65.80 STENOSING TENOSYNOVITIS: Primary | ICD-10-CM

## 2025-07-15 PROCEDURE — 99204 OFFICE O/P NEW MOD 45 MIN: CPT | Performed by: INTERNAL MEDICINE

## 2025-07-15 PROCEDURE — 1160F RVW MEDS BY RX/DR IN RCRD: CPT | Performed by: INTERNAL MEDICINE

## 2025-07-15 PROCEDURE — 1036F TOBACCO NON-USER: CPT | Performed by: INTERNAL MEDICINE

## 2025-07-15 PROCEDURE — 3008F BODY MASS INDEX DOCD: CPT | Performed by: INTERNAL MEDICINE

## 2025-07-15 PROCEDURE — 1159F MED LIST DOCD IN RCRD: CPT | Performed by: INTERNAL MEDICINE

## 2025-07-15 NOTE — PROGRESS NOTES
"Subjective   Patient ID: [id number]   Bryan Edwards \"Manolo" is a 70 y.o. male who presents for Hand Pain (Both hands possible trigger finger ).  HPI    Patient  with BPH here for evaluation of trigger fingers of the left hand  Previously has been seeing hand ortho and has had 2 trigger finger release surgeries in the left third and right 4th digit which good success  But since March he has been noticing locking of the left 4th and 5th digit and occasionally right 3rd finger. This happens intermittently. Has now difficulting in making a full fist with left hand because of this.  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    Denies fever, chills, weight loss, night sweats or headaches   Denies dry eyes, blurry vision, redness or pain or photophobia  Denies dry mouth, dental loss, loss of taste, nasal or oral ulcers, jaw claudication, difficulty swallowing, nasal crusting or recurrent sinus infections   Denies chest pain, palpitations, orthopnea  Denies shortness of breath, cough, asthma, or recurrent respiratory infections   Denies dysphagia, nausea, vomiting, heartburn, abdominal pain, constipation, diarrhea, melena or hematochezia   No recurrent urinary infections or STDs, no genital or anal ulcers.  Integumentary: Denies photosensitivity, rash or lesions, Raynaud's phenomenon, skin tightening, digital ulcers, psoriatic lesions, or alopecia  Denies any numbness or tingling, muscle weakness, or incontinence   Hematologic/Lymphatic: Denies bleeding, bruising, history of clots    No joint pains, redness, hotness or swelling. No inflammatory back pain, enthesitis, dactylitis. No morning stiffness    Denies weakness, difficulty rising from chair or combing the hair, muscle aches, or problems with hand     No family history of autoimmune diseases       Problem " List[1]     Medical History[2]     Surgical History[3]     Social History     Socioeconomic History    Marital status:      Spouse name: Not on file    Number of children: Not on file    Years of education: Not on file    Highest education level: Not on file   Occupational History    Not on file   Tobacco Use    Smoking status: Former     Current packs/day: 1.00     Average packs/day: 1 pack/day for 40.0 years (40.0 ttl pk-yrs)     Types: Cigarettes     Start date: 12/31/1975     Quit date: 12/31/1985    Smokeless tobacco: Never   Substance and Sexual Activity    Alcohol use: Yes    Drug use: Not Currently    Sexual activity: Never   Other Topics Concern    Not on file   Social History Narrative    Not on file     Social Drivers of Health     Financial Resource Strain: Not on file   Food Insecurity: Not on file   Transportation Needs: Not on file   Physical Activity: Not on file   Stress: Not on file   Social Connections: Not on file   Intimate Partner Violence: Not on file   Housing Stability: Not on file        RX Allergies[4]     Current Medications[5]       Objective     Visit Vitals  /70   Pulse 71        Physical Exam    Clicking of the left 5th digit with a1 thickening  Mild a1 thickening of the 4th digit on left side, not actively triggering on exam  Right hand normal  Rest of the exam normal as outlined below    General: AAOx3, Cooperative    Skin: No rashes, ulcers or photosensitive areas  MSK: Upper Extremities:  Hand/Fingers: No erythema, swelling, tenderness or warmth at DIP, PIP, or MCP joints, FROM grossly. Good hand . No nodules. No deformities   Wrists: No erythema, swelling, warmth or tenderness at wrist, FROM grossly  Elbows: No tenderness, swelling, erythema or warmth at elbows, FROM grossly. No nodules   Shoulders: No swelling, erythema, tenderness or warmth at shoulders. FROM  Lower Extremities:   Hips: No obvious deformities. No joint tenderness, normal ROM grossly. Log roll  "test negative bilaterally. Luciano test is negative bilaterally. No trochanteric bursae TTP  Knees: No tenderness, deformities, swelling, rashes, or warmth, normal ROM grossly. No crepitus, no pes anserine bursa TTP   Ankles: No deformities, tenderness, edema, erythema, ulceration, or warmth at the ankle  Feet: Negative MTP squeeze. Normal ROM grossly.   Spine: No spinal tenderness to palpation. No SI joint tenderness. Gaenslen test negative       [unfilled]      Lab Results   Component Value Date    WBC 4.4 12/20/2024    HGB 14.1 12/20/2024    HCT 41.8 12/20/2024    MCV 83 12/20/2024     12/20/2024        Chemistry    Lab Results   Component Value Date/Time     12/20/2024 0834    K 4.4 12/20/2024 0834     12/20/2024 0834    CO2 27 12/20/2024 0834    BUN 14 12/20/2024 0834    CREATININE 0.96 12/20/2024 0834    Lab Results   Component Value Date/Time    CALCIUM 9.7 12/20/2024 0834    ALKPHOS 65 12/20/2024 0834    AST 30 12/20/2024 0834    ALT 21 12/20/2024 0834    BILITOT 0.7 12/20/2024 0834           No results found for: \"CRP\"   No results found for: \"HANNAH\", \"RF\", \"SEDRATE\"   No results found for: \"CKTOTAL\"  Lab Results   Component Value Date    NEUTROABS 1.86 12/20/2024      No results found for: \"FERRITIN\"   No results found for: \"HEPATOT\", \"HEPAIGM\", \"HEPBCIGM\", \"HEPBCAB\", \"HBEAG\", \"HEPCAB\"   Lab Results   Component Value Date    ALT 21 12/20/2024    AST 30 12/20/2024    ALKPHOS 65 12/20/2024    BILITOT 0.7 12/20/2024      No results found for: \"PPD\"   No results found for: \"URICACID\"   Lab Results   Component Value Date    CALCIUM 9.7 12/20/2024      No results found for: \"SPEP\", \"UPEP\"   No results found for: \"ALBUR\", \"[id number]\"   .last          ECG 12 lead (Ancillary Performed)  Sinus rhythm with Premature atrial complexes with Aberrant conduction  Rightward axis  Borderline ECG  When compared with ECG of 03-AUG-2022 14:54,  No significant change was found  Confirmed by Leland Abdi " ([id number]) on 12/20/2024 9:53:42 PM     === 11/27/24 ===    XR HAND 3+ VIEWS RIGHT    - Impression -  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 No acute abnormality seen    MACRO:  None    Signed by: Jayy Herrera 11/28/2024 8:50 AM  Dictation workstation:   [id number]             Assessment/Plan   Diagnoses and all orders for this visit:  Stenosing tenosynovitis  Patient is a , I informed him that activities involving  repetitive finger flexion etc can irritate tendons and their sheaths, leading to inflammation  He is otherwise Non diabetic, and has no thyroid problems  He is aiming for non surgical intervention and we discussed role of CSI in stenosing tenosynovitis   He will see me back in next available appointment in [facility] for ultrasound guided CSI for left 4th and 5th digit    #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     To schedule an appointment, call  [phone] [place] or call [phone] for [facility] ([facility])          [1]   Patient Active Problem List  Diagnosis    Abnormal urine cytology    Benign prostatic hyperplasia with lower urinary tract symptoms   [2]   Past Medical History:  Diagnosis Date    Personal history of other endocrine, nutritional and metabolic disease     History of hypercholesterolemia    Pure hyperglyceridemia     Hypertriglyceridemia    Trigger finger 2022   [3]   Past Surgical History:  Procedure Laterality Date    OTHER SURGICAL HISTORY  04/27/2015    Prior Surgical Procedure Not Done    OTHER SURGICAL HISTORY  11/07/2016    Wedge Resection Of Lung    TRIGGER FINGER RELEASE  2022   [4] No Known  Allergies  [5]   Current Outpatient Medications:     aspirin 81 mg EC tablet, Take 1 tablet (81 mg) by mouth once daily., Disp: , Rfl:     cholecalciferol (Vitamin D-3) 50 mcg (2,000 unit) capsule, Take by mouth., Disp: , Rfl:     dutasteride-tamsulosin 0.5-0.4 mg capsule, ER multiphase 24 hr, Take 1 capsule by mouth once daily., Disp: 90 capsule, Rfl: 3    ferrous gluconate 236 mg (27 mg iron) tablet, Take by mouth., Disp: , Rfl:     mirabegron (Myrbetriq) 50 mg tablet extended release 24 hr 24 hr tablet, Take 1 tablet (50 mg) by mouth once daily., Disp: 90 tablet, Rfl: 3    multivitamin tablet, Take 1 tablet by mouth once daily., Disp: , Rfl:     omega-3 acid ethyl esters (Lovaza) 1 gram capsule, Take by mouth twice a day., Disp: , Rfl:     pravastatin (Pravachol) 40 mg tablet, Take 1 tablet (40 mg) by mouth once daily., Disp: , Rfl:     tadalafil (Cialis) 20 mg tablet, Take 1 tablet (20 mg) by mouth once daily as needed for erectile dysfunction., Disp: 90 tablet, Rfl: 0

## 2025-07-18 ENCOUNTER — OFFICE VISIT (OUTPATIENT)
Facility: CLINIC | Age: 70
End: 2025-07-18
Payer: MEDICARE

## 2025-07-18 VITALS
HEIGHT: 70 IN | HEART RATE: 65 BPM | WEIGHT: 171 LBS | SYSTOLIC BLOOD PRESSURE: 145 MMHG | TEMPERATURE: 97.5 F | DIASTOLIC BLOOD PRESSURE: 76 MMHG | BODY MASS INDEX: 24.48 KG/M2

## 2025-07-18 DIAGNOSIS — M65.80 STENOSING TENOSYNOVITIS: ICD-10-CM

## 2025-07-18 RX ORDER — LIDOCAINE HYDROCHLORIDE 10 MG/ML
0.25 INJECTION, SOLUTION INFILTRATION; PERINEURAL
Status: COMPLETED | OUTPATIENT
Start: 2025-07-18 | End: 2025-07-18

## 2025-07-18 RX ORDER — TRIAMCINOLONE ACETONIDE 40 MG/ML
10 INJECTION, SUSPENSION INTRA-ARTICULAR; INTRAMUSCULAR
Status: COMPLETED | OUTPATIENT
Start: 2025-07-18 | End: 2025-07-18

## 2025-07-18 RX ADMIN — LIDOCAINE HYDROCHLORIDE 0.25 ML: 10 INJECTION, SOLUTION INFILTRATION; PERINEURAL at 12:40

## 2025-07-18 RX ADMIN — TRIAMCINOLONE ACETONIDE 10 MG: 40 INJECTION, SUSPENSION INTRA-ARTICULAR; INTRAMUSCULAR at 12:40

## 2025-07-18 NOTE — PROGRESS NOTES
"Patient ID: Bryan Edwards \"Markie\" is a 70 y.o. male.    Injection tendon or ligament: L ring A1 (Left small finger A1 and left ring A1) for trigger finger on 7/18/2025 12:40 PM  Details: 25 G needle, ultrasound-guided volar approach  Medications: 10 mg triamcinolone acetonide 40 mg/mL; 0.25 mL lidocaine 10 mg/mL (1 %)    RISKS DISCUSSED WITH CONSENTING PERSON: Including, but not limited to Hemorrhage, Infection, Injury to tendons, nerves and ligaments, skin hypopigmentation and subcutaneous atrophy. Alternative treatment to joint injection discussed. Complications of alternative treatments including no treatment at all were discussed.   ANTISEPTIC PREPARATION: 2% chlorhexidine (chloraprep)  NUMBER OF ATTEMPTS/NEEDLE STICKS: 1  WAS THE PROCEDURE PERFORMED SUCCESSFULLY? Yes   OTHER NOTES:Dressing in place after procedure was: bandaid  COMPLICATIONS:none       Under direct ultrasound guidance 10 mg kenlog ( 0.25 ml) and 0.25 ml lidocaine was injected near A1 pulley tendon sheath of Left Ringer and little finger. Patient tolerated the procedure well    Patient was prepped and draped in the usual sterile fashion.           Christopher Gutiérrez MD FACP FACR   of Medicine  [facility] - Department of Rheumatology  [facility]     "

## 2025-07-24 ENCOUNTER — HOSPITAL ENCOUNTER (OUTPATIENT)
Dept: RADIOLOGY | Facility: HOSPITAL | Age: 70
Discharge: HOME | End: 2025-07-24
Payer: MEDICARE

## 2025-07-24 ENCOUNTER — LAB (OUTPATIENT)
Facility: HOSPITAL | Age: 70
End: 2025-07-24
Payer: MEDICARE

## 2025-07-24 DIAGNOSIS — N40.1 BENIGN PROSTATIC HYPERPLASIA WITH LOWER URINARY TRACT SYMPTOMS, SYMPTOM DETAILS UNSPECIFIED: ICD-10-CM

## 2025-07-24 DIAGNOSIS — N28.1 RENAL CYST: ICD-10-CM

## 2025-07-24 DIAGNOSIS — R82.89 ABNORMAL URINE CYTOLOGY: ICD-10-CM

## 2025-07-24 LAB — PSA SERPL-MCNC: 0.32 NG/ML

## 2025-07-24 PROCEDURE — 36415 COLL VENOUS BLD VENIPUNCTURE: CPT

## 2025-07-24 PROCEDURE — 76770 US EXAM ABDO BACK WALL COMP: CPT

## 2025-07-24 PROCEDURE — 84153 ASSAY OF PSA TOTAL: CPT | Mod: ELYLAB

## 2025-07-24 PROCEDURE — 76770 US EXAM ABDO BACK WALL COMP: CPT | Performed by: RADIOLOGY

## 2025-07-24 PROCEDURE — 88112 CYTOPATH CELL ENHANCE TECH: CPT | Mod: TC

## 2025-07-30 ENCOUNTER — APPOINTMENT (OUTPATIENT)
Dept: UROLOGY | Facility: CLINIC | Age: 70
End: 2025-07-30
Payer: MEDICARE

## 2025-07-30 VITALS
SYSTOLIC BLOOD PRESSURE: 152 MMHG | HEART RATE: 79 BPM | WEIGHT: 177.03 LBS | DIASTOLIC BLOOD PRESSURE: 63 MMHG | BODY MASS INDEX: 25.34 KG/M2 | RESPIRATION RATE: 16 BRPM | HEIGHT: 70 IN

## 2025-07-30 DIAGNOSIS — N40.1 BENIGN PROSTATIC HYPERPLASIA WITH LOWER URINARY TRACT SYMPTOMS, SYMPTOM DETAILS UNSPECIFIED: Primary | ICD-10-CM

## 2025-07-30 DIAGNOSIS — R82.89 ABNORMAL URINE CYTOLOGY: ICD-10-CM

## 2025-07-30 DIAGNOSIS — N28.1 RENAL CYST: ICD-10-CM

## 2025-07-30 DIAGNOSIS — N32.81 OAB (OVERACTIVE BLADDER): ICD-10-CM

## 2025-07-30 PROCEDURE — 1126F AMNT PAIN NOTED NONE PRSNT: CPT | Performed by: UROLOGY

## 2025-07-30 PROCEDURE — 3008F BODY MASS INDEX DOCD: CPT | Performed by: UROLOGY

## 2025-07-30 PROCEDURE — 1159F MED LIST DOCD IN RCRD: CPT | Performed by: UROLOGY

## 2025-07-30 PROCEDURE — 1160F RVW MEDS BY RX/DR IN RCRD: CPT | Performed by: UROLOGY

## 2025-07-30 PROCEDURE — 99214 OFFICE O/P EST MOD 30 MIN: CPT | Performed by: UROLOGY

## 2025-07-30 PROCEDURE — G2211 COMPLEX E/M VISIT ADD ON: HCPCS | Performed by: UROLOGY

## 2025-07-30 RX ORDER — DUTASTERIDE AND TAMSULOSIN HYDROCHLORIDE CAPSULES .5; .4 MG/1; MG/1
1 CAPSULE ORAL DAILY
Qty: 90 CAPSULE | Refills: 3 | Status: SHIPPED | OUTPATIENT
Start: 2025-07-30 | End: 2025-07-31 | Stop reason: SDUPTHER

## 2025-07-30 RX ORDER — TADALAFIL 20 MG/1
20 TABLET ORAL DAILY PRN
Qty: 90 TABLET | Refills: 0 | Status: SHIPPED | OUTPATIENT
Start: 2025-07-30 | End: 2025-10-29

## 2025-07-30 RX ORDER — MIRABEGRON 50 MG/1
50 TABLET, FILM COATED, EXTENDED RELEASE ORAL DAILY
Qty: 90 TABLET | Refills: 3 | Status: SHIPPED | OUTPATIENT
Start: 2025-07-30 | End: 2025-07-31 | Stop reason: SDUPTHER

## 2025-07-30 ASSESSMENT — PAIN SCALES - GENERAL: PAINLEVEL_OUTOF10: 0-NO PAIN

## 2025-07-30 ASSESSMENT — ENCOUNTER SYMPTOMS
DYSURIA: 0
FREQUENCY: 0
HEMATURIA: 0
DIFFICULTY URINATING: 0

## 2025-07-30 NOTE — LETTER
July 30, 2025     Gio Thayer MD  [address]  [place] [address]    Patient: Markie Edwards   YOB: 1955   Date of Visit: 7/30/2025       Dear Dr. Gio Thayer MD:    Thank you for referring Markie Edwards to me for evaluation. Below are my notes for this consultation.  If you have questions, please do not hesitate to call me. I look forward to following your patient along with you.       Sincerely,     Elías Robbins MD      CC: No Recipients  ______________________________________________________________________________________    Provider Impressions     70 year-old white male retired at the age of 55. No family history of prostate cancer. 15-pack-year cigarette smoking.     02/14/13 CYSTOSCOPY revealed a severely ELEVATED BLADDER NECK with a flow rate of 6 cc per second and a PVR of 90. Patient was begun on Rohini and Toviaz at the 4 mg dose       ROHINI      08/06/13 . Patient complains of ERECTILE DYSFUNCTION and saw Dr. Borrego.     04/30/14, . Corrected PSA 0.8. Daytime URINARY FREQUENCY every 3 hours and nocturia x1.     04/27/15, flow rate of 11 with a PVR 15. COMPLEX HEPATIC CYSTS and SIMPLE RENAL CYSTS. Negative cytology. Corrected PSA of 0.6.     04/18/16, patient has been relegated from Rohini to tamsulosin and dutasteride due to insurance issues. His prescription for Toviaz has also doubled in price and he wishes to discontinue. Corrected PSA is 2.8. Urine cytology is negative. Renal ultrasound does show that the 1.4 cm COMPLEX CYST is increasing with debris and a recommended renal dedicated CAT scan. He will return in a month with that test.     06/03/16, dedicated renal CAT scan reveals a 13 mm left SIMPLE RENAL CYST, BOSNIAK TYPE I. The second cyst is most likely HEMORRHAGIC, BOSNIAK TYPE II LESION. There are no solid or enhancing masses. No tumors seen. Of note, a 12 mm NODULE IN THE LEFT LOWER LOBE OF THE LUNG was identified. He should return in 1 month with a  CAT scan of the chest.     07/08/16, CAT scan of the chest reveals a 1.6 cm left lower pole NODULE. This is the same as had been seen on the CAT scan of the abdomen. We will refer to our pulmonary specialist, Dr. Cardenas for further evaluation. Patient also wishes to return to Rohini rather than tamsulosin and dutasteride. He will return to my service in April.     10/26/16, OR, wedge resection of the lung, Dr. Haro. Pathology: Pulmonary harmartoma     04/21/17, patient continues to do well on his Rohini and Myrbetric 50 mg. Flow rate of 13 with a PVR of 0. Renal ultrasound shows stable complex left renal cysts. PSA 0.7. Urine cytology is negative. He will return in 1 year.     04/23/18, patient has no complaints and is very happy on his regimen of Rohini and Myrbetric at the 50 mg dose. Flow rate once again is excellent at 14 with a PVR of 0. Renal ultrasound once again shows stable complex cysts. PSA 0.3, corrected for Rohini 0.6. Cytology is negative. We will see him again in 1 year.     04/22/19, patient recently had meniscus repair of his left knee. He states that the combination of Rohini and Myrbetric at the 50 mg dose is working well. However he is interested in the resume procedure as he is concerned that the side effects of the medication may be decreasing his libido. I will recommend that he speak with Dr. Reveles and he will personally follow-up rather than official consultation. His flow rate is 8 cc/s with a PVR 54. Renal ultrasound shows stable cysts bilaterally. Cytology is negative in the corrected PSA is 0.8. He will return in 1 year.     07/15/20, patient arrives alone. He continues his combination of Rohini and Myrbetric at the 50 mg dose which are both working well. He has a PVR of 14 cc and no further urgency. He did look into the resume procedure and was deemed unnecessary and his level of problem. Renal ultrasound shows stable bilateral renal cysts and stable hepatic cysts. Cytology is negative  for malignant cells and his corrected PSA once again is 0.8. He will return in 1 year.     July 16, 2021, patient arrives alone. He continues to have excellent results with his combination Rohini and Myrbetriq at the 50 mg dose. Flow rate 13 cc/s, total volume 468 cc, PVR 57 cc. He no longer has urgency or nocturia. Renal ultrasound once again shows stable bilateral renal cysts. Urine cytology is negative for malignant cells. His corrected PSA is continues to be 0.8. He will return in 1 year. He will be traveling to [place] with his wife to hike in [place].     July 20, 2022, patient arrives alone. He has no urologic complaints and states that the combination of Rohini and Myrbetriq at the 50 mg dose is working well. Today's flow rate 14 cc/s, total volume 497 cc,  cc. No longer experiencing urinary urgency or nocturia. Renal ultrasound identifies unchanged, bilateral, simple renal cysts. Urine cytology is again negative for malignant cells. Corrected PSA 0.62. He will return in 1 year. Planning a trip to [place] in [place] summer.     September 1, 2022, OR, Dr. Vaughn Ashford, trigger finger release     July 25, 2023, patient arrives alone. He has no new urologic complaints. Combination of Rohini and Myrbetriq 50 mg daily continues to be successful. Today's flow rate 18 cc/s, total volume 332 cc and  cc. No further urinary urgency or nocturia. Renal ultrasound Shows bilateral cysts which are unchanged. Corrected PSA is 3.40. Urine cytology was atypical, cannot rule out neoplasm. Patient wishes to repeat the urine cytology in 6 months before determining whether to pursue a cystoscopy.     January 23, 2024, patient arrives alone.  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  No urologic complaints.  Repeat urine cytology was negative for malignant cells.  He will return in July.     July 31, 2024, patient arrives alone.  He continues guitar playing in his band during his  [facility].  No urologic complaints.  Urine cytology is negative for malignant cells.  Corrected PSA is 0.84.  Renal ultrasound shows simple, bilateral renal cysts unchanged.  He continues on daily Rohini and Myrbetriq.  Also Cialis 20 mg as needed.  PVR of 88 cc.  He will return in 1 year.    January 8, 2025, OR, Dr. Vaughn Ashford.  Right trigger finger release    30th 2025, patient arrives alone.  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  No new urologic complaints.  Corrected PSA is 0.64.  Renal ultrasound shows bilateral, stable simple cysts.  Urine cytology shows clusters of atypical urothelial cells present; origin from urothelial neoplasm cannot be excluded.  I have suggested an in office cystoscopy for further evaluation.  The patient would like to wait 1 year and repeat his cytology test.  He will continue on daily Rohini, and Myrbetriq.  Cialis on a as needed basis.  Today's  cc.     PLAN:     #1 the patient will continue Rohini one tablet by mouth daily. And Myrbetric 50 mg by mouth daily.  Cialis 20 mg as needed     #2 the patient return in August 2026, for insurance purposes, with urine cytology, PSA, renal and bladder ultrasound, and postvoid residual.     Physical Exam  Vitals and nursing note reviewed.   Constitutional:       Appearance: Normal appearance.   HENT:      Head: Normocephalic and atraumatic.   Pulmonary:      Effort: Pulmonary effort is normal.   Abdominal:      Palpations: Abdomen is soft.      Tenderness: There is no abdominal tenderness.   Musculoskeletal:         General: Normal range of motion.      Cervical back: Normal range of motion and neck supple.   Neurological:      General: No focal deficit present.      Mental Status: He is alert and oriented to person, place, and time.   Psychiatric:         Mood and Affect: Mood normal.         Behavior: Behavior normal.        This note was created with voice-recognition software and was not corrected for  typographical or grammatical errors.

## 2025-07-30 NOTE — PATIENT INSTRUCTIONS
Patient Discussion/Summary     It was good to see you once again. Your repeat urine cytology does show possibility of malignant cells.  You do not wish to proceed with a cystoscopy at this time and would like to repeat the test in 1 year.  Corrected PSA is 0.64.  Ultrasound of the kidneys shows stable simple cysts.  Therefore we will see you again in August.  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  The combination of [employer] and [employer] is working extremely well.      This note was created with voice-recognition software and was not corrected for typographical or grammatical errors.

## 2025-07-30 NOTE — PROGRESS NOTES
Patient denies any pain today. Patient had right hand trigger release with Dr. Ashford on 1/8/25. Patient denies any concerns about falling or safety. Patient has no new urinary issues. Patient taking Rohini, Cialis, and Myrbetriq as directed. CV    Review of Systems   Genitourinary:  Negative for decreased urine volume, difficulty urinating, dysuria, enuresis, frequency, hematuria, penile discharge, penile pain, penile swelling, scrotal swelling and testicular pain.   All other systems reviewed and are negative.

## 2025-07-30 NOTE — PROGRESS NOTES
Provider Impressions     70 year-old white male retired at the age of 55. No family history of prostate cancer. 15-pack-year cigarette smoking.     02/14/13 CYSTOSCOPY revealed a severely ELEVATED BLADDER NECK with a flow rate of 6 cc per second and a PVR of 90. Patient was begun on Tiana and Toviaz at the 4 mg dose       TIANA      08/06/13 . Patient complains of ERECTILE DYSFUNCTION and saw Dr. Borrego.     04/30/14, . Corrected PSA 0.8. Daytime URINARY FREQUENCY every 3 hours and nocturia x1.     04/27/15, flow rate of 11 with a PVR 15. COMPLEX HEPATIC CYSTS and SIMPLE RENAL CYSTS. Negative cytology. Corrected PSA of 0.6.     04/18/16, patient has been relegated from Tiana to tamsulosin and dutasteride due to insurance issues. His prescription for Toviaz has also doubled in price and he wishes to discontinue. Corrected PSA is 2.8. Urine cytology is negative. Renal ultrasound does show that the 1.4 cm COMPLEX CYST is increasing with debris and a recommended renal dedicated CAT scan. He will return in a month with that test.     06/03/16, dedicated renal CAT scan reveals a 13 mm left SIMPLE RENAL CYST, BOSNIAK TYPE I. The second cyst is most likely HEMORRHAGIC, BOSNIAK TYPE II LESION. There are no solid or enhancing masses. No tumors seen. Of note, a 12 mm NODULE IN THE LEFT LOWER LOBE OF THE LUNG was identified. He should return in 1 month with a CAT scan of the chest.     07/08/16, CAT scan of the chest reveals a 1.6 cm left lower pole NODULE. This is the same as had been seen on the CAT scan of the abdomen. We will refer to our pulmonary specialist, Dr. Cardenas for further evaluation. Patient also wishes to return to Tiana rather than tamsulosin and dutasteride. He will return to my service in April.     10/26/16, OR, wedge resection of the lung, Dr. Haro. Pathology: Pulmonary harmartoma     04/21/17, patient continues to do well on his Itana and Myrbetric 50 mg. Flow rate of 13 with a PVR of 0.  Renal ultrasound shows stable complex left renal cysts. PSA 0.7. Urine cytology is negative. He will return in 1 year.     04/23/18, patient has no complaints and is very happy on his regimen of Rohini and Myrbetric at the 50 mg dose. Flow rate once again is excellent at 14 with a PVR of 0. Renal ultrasound once again shows stable complex cysts. PSA 0.3, corrected for Rohini 0.6. Cytology is negative. We will see him again in 1 year.     04/22/19, patient recently had meniscus repair of his left knee. He states that the combination of Rohini and Myrbetric at the 50 mg dose is working well. However he is interested in the resume procedure as he is concerned that the side effects of the medication may be decreasing his libido. I will recommend that he speak with Dr. Reveles and he will personally follow-up rather than official consultation. His flow rate is 8 cc/s with a PVR 54. Renal ultrasound shows stable cysts bilaterally. Cytology is negative in the corrected PSA is 0.8. He will return in 1 year.     07/15/20, patient arrives alone. He continues his combination of Rohini and Myrbetric at the 50 mg dose which are both working well. He has a PVR of 14 cc and no further urgency. He did look into the resume procedure and was deemed unnecessary and his level of problem. Renal ultrasound shows stable bilateral renal cysts and stable hepatic cysts. Cytology is negative for malignant cells and his corrected PSA once again is 0.8. He will return in 1 year.     July 16, 2021, patient arrives alone. He continues to have excellent results with his combination Rohini and Myrbetriq at the 50 mg dose. Flow rate 13 cc/s, total volume 468 cc, PVR 57 cc. He no longer has urgency or nocturia. Renal ultrasound once again shows stable bilateral renal cysts. Urine cytology is negative for malignant cells. His corrected PSA is continues to be 0.8. He will return in 1 year. He will be traveling to [place] with his wife to hike in [place].     July 20, 2022, patient arrives alone. He has no urologic complaints and states that the combination of Rohini and Myrbetriq at the 50 mg dose is working well. Today's flow rate 14 cc/s, total volume 497 cc,  cc. No longer experiencing urinary urgency or nocturia. Renal ultrasound identifies unchanged, bilateral, simple renal cysts. Urine cytology is again negative for malignant cells. Corrected PSA 0.62. He will return in 1 year. Planning a trip to [place] in [place].     September 1, 2022, OR, Dr. Vaughn Ashford, trigger finger release     July 25, 2023, patient arrives alone. He has no new urologic complaints. Combination of Rohini and Myrbetriq 50 mg daily continues to be successful. Today's flow rate 18 cc/s, total volume 332 cc and  cc. No further urinary urgency or nocturia. Renal ultrasound Shows bilateral cysts which are unchanged. Corrected PSA is 3.40. Urine cytology was atypical, cannot rule out neoplasm. Patient wishes to repeat the urine cytology in 6 months before determining whether to pursue a cystoscopy.     January 23, 2024, patient arrives alone.  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  No urologic complaints.  Repeat urine cytology was negative for malignant cells.  He will return in July.     July 31, 2024, patient arrives alone.  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  No urologic complaints.  Urine cytology is negative for malignant cells.  Corrected PSA is 0.84.  Renal ultrasound shows simple, bilateral renal cysts unchanged.  He continues on daily Rohini and Myrbetriq.  Also Cialis 20 mg as needed.  PVR of 88 cc.  He will return in 1 year.    January 8, 2025, OR, Dr. Vaughn Ashford.  Right trigger finger release    30th 2025, patient arrives alone.  He continues in his [employer] to play guitar in a band.  No new urologic complaints.  Corrected PSA is 0.64.  Renal ultrasound shows bilateral, stable simple cysts.   Urine cytology shows clusters of atypical urothelial cells present; origin from urothelial neoplasm cannot be excluded.  I have suggested an in office cystoscopy for further evaluation.  The patient would like to wait 1 year and repeat his cytology test.  He will continue on daily Rohini, and Myrbetriq.  Cialis on a as needed basis.  Today's  cc.     PLAN:     #1 the patient will continue Rohini one tablet by mouth daily. And Myrbetric 50 mg by mouth daily.  Cialis 20 mg as needed     #2 the patient return in August 2026, for insurance purposes, with urine cytology, PSA, renal and bladder ultrasound, and postvoid residual.     Physical Exam  Vitals and nursing note reviewed.   Constitutional:       Appearance: Normal appearance.   HENT:      Head: Normocephalic and atraumatic.   Pulmonary:      Effort: Pulmonary effort is normal.   Abdominal:      Palpations: Abdomen is soft.      Tenderness: There is no abdominal tenderness.   Musculoskeletal:         General: Normal range of motion.      Cervical back: Normal range of motion and neck supple.   Neurological:      General: No focal deficit present.      Mental Status: He is alert and oriented to person, place, and time.   Psychiatric:         Mood and Affect: Mood normal.         Behavior: Behavior normal.        This note was created with voice-recognition software and was not corrected for typographical or grammatical errors.

## 2025-07-31 DIAGNOSIS — N32.81 OAB (OVERACTIVE BLADDER): ICD-10-CM

## 2025-07-31 DIAGNOSIS — N40.1 BENIGN PROSTATIC HYPERPLASIA WITH LOWER URINARY TRACT SYMPTOMS, SYMPTOM DETAILS UNSPECIFIED: ICD-10-CM

## 2025-07-31 RX ORDER — DUTASTERIDE AND TAMSULOSIN HYDROCHLORIDE CAPSULES .5; .4 MG/1; MG/1
1 CAPSULE ORAL DAILY
Qty: 90 CAPSULE | Refills: 3 | Status: SHIPPED | OUTPATIENT
Start: 2025-07-31

## 2025-07-31 RX ORDER — MIRABEGRON 50 MG/1
50 TABLET, FILM COATED, EXTENDED RELEASE ORAL DAILY
Qty: 90 TABLET | Refills: 3 | Status: SHIPPED | OUTPATIENT
Start: 2025-07-31

## 2025-09-15 ENCOUNTER — APPOINTMENT (OUTPATIENT)
Facility: CLINIC | Age: 70
End: 2025-09-15
Payer: MEDICARE

## 2026-08-17 ENCOUNTER — APPOINTMENT (OUTPATIENT)
Dept: UROLOGY | Facility: CLINIC | Age: 71
End: 2026-08-17
Payer: MEDICARE

## 2026-08-28 ENCOUNTER — APPOINTMENT (OUTPATIENT)
Dept: UROLOGY | Facility: CLINIC | Age: 71
End: 2026-08-28
Payer: MEDICARE